# Patient Record
Sex: FEMALE | Race: WHITE | Employment: UNEMPLOYED | URBAN - METROPOLITAN AREA
[De-identification: names, ages, dates, MRNs, and addresses within clinical notes are randomized per-mention and may not be internally consistent; named-entity substitution may affect disease eponyms.]

---

## 2020-11-19 DIAGNOSIS — M79.601 RIGHT ARM PAIN: Primary | ICD-10-CM

## 2020-11-25 ENCOUNTER — HOSPITAL ENCOUNTER (OUTPATIENT)
Dept: GENERAL RADIOLOGY | Age: 25
Discharge: HOME | End: 2020-11-25
Payer: OTHER GOVERNMENT

## 2020-11-25 ENCOUNTER — OFFICE VISIT (OUTPATIENT)
Dept: ORTHOPEDIC SURGERY | Age: 25
End: 2020-11-25
Payer: OTHER GOVERNMENT

## 2020-11-25 VITALS
BODY MASS INDEX: 37.77 KG/M2 | OXYGEN SATURATION: 96 % | WEIGHT: 235 LBS | HEART RATE: 76 BPM | TEMPERATURE: 98.3 F | HEIGHT: 66 IN | DIASTOLIC BLOOD PRESSURE: 82 MMHG | SYSTOLIC BLOOD PRESSURE: 115 MMHG

## 2020-11-25 DIAGNOSIS — M79.2 NEUROGENIC PAIN: ICD-10-CM

## 2020-11-25 DIAGNOSIS — M25.521 CHRONIC PAIN OF RIGHT ELBOW: Primary | ICD-10-CM

## 2020-11-25 DIAGNOSIS — M19.121 POST-TRAUMATIC OSTEOARTHRITIS OF RIGHT ELBOW: ICD-10-CM

## 2020-11-25 DIAGNOSIS — G89.29 CHRONIC PAIN OF RIGHT ELBOW: Primary | ICD-10-CM

## 2020-11-25 DIAGNOSIS — M79.601 RIGHT ARM PAIN: ICD-10-CM

## 2020-11-25 PROCEDURE — 73090 X-RAY EXAM OF FOREARM: CPT

## 2020-11-25 PROCEDURE — 99204 OFFICE O/P NEW MOD 45 MIN: CPT | Performed by: ORTHOPAEDIC SURGERY

## 2020-11-25 RX ORDER — GABAPENTIN 100 MG/1
100 CAPSULE ORAL 3 TIMES DAILY
Qty: 90 CAP | Refills: 0 | Status: SHIPPED | OUTPATIENT
Start: 2020-11-25 | End: 2021-11-30

## 2020-11-25 NOTE — PROGRESS NOTES
Identified pt with two pt identifiers (name and ). Reviewed chart in preparation for visit and have obtained necessary documentation. Dereck Yoder is a 31 y.o. female  Chief Complaint   Patient presents with    Arm Pain     RT arm     Visit Vitals  /82 (BP 1 Location: Left arm, BP Patient Position: Sitting)   Pulse 76   Temp 98.3 °F (36.8 °C) (Tympanic)   Ht 5' 6\" (1.676 m)   Wt 235 lb (106.6 kg)   SpO2 96%   BMI 37.93 kg/m²     1. Have you been to the ER, urgent care clinic since your last visit? Hospitalized since your last visit? No    2. Have you seen or consulted any other health care providers outside of the [address] [place] [address] since your last visit? Include any pap smears or colon screening.  No
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
## 2020-12-09 ENCOUNTER — TELEPHONE (OUTPATIENT)
Dept: ORTHOPEDIC SURGERY | Age: 25
End: 2020-12-09

## 2020-12-09 ENCOUNTER — VIRTUAL VISIT (OUTPATIENT)
Dept: ORTHOPEDIC SURGERY | Age: 25
End: 2020-12-09
Payer: OTHER GOVERNMENT

## 2020-12-09 DIAGNOSIS — G89.29 CHRONIC PAIN OF RIGHT ELBOW: Primary | ICD-10-CM

## 2020-12-09 DIAGNOSIS — T84.84XD PAIN DUE TO INTERNAL ORTHOPEDIC PROSTHETIC DEVICE, SUBSEQUENT ENCOUNTER: ICD-10-CM

## 2020-12-09 DIAGNOSIS — M25.521 CHRONIC PAIN OF RIGHT ELBOW: Primary | ICD-10-CM

## 2020-12-09 PROBLEM — T84.84XA PAIN DUE TO INTERNAL ORTHOPEDIC PROSTHETIC DEVICE (HCC): Status: ACTIVE | Noted: 2020-12-09

## 2020-12-09 PROCEDURE — 99443 PR PHYS/QHP TELEPHONE EVALUATION 21-30 MIN: CPT | Performed by: ORTHOPAEDIC SURGERY

## 2020-12-09 NOTE — TELEPHONE ENCOUNTER
Patient returned call. Requesting surgery before 12/14/20. Advised I would need to check with provider.
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
## 2020-12-10 ENCOUNTER — HOSPITAL ENCOUNTER (OUTPATIENT)
Dept: PREADMISSION TESTING | Age: 25
Discharge: HOME | End: 2020-12-10
Payer: OTHER GOVERNMENT

## 2020-12-10 PROCEDURE — 87635 SARS-COV-2 COVID-19 AMP PRB: CPT

## 2020-12-11 LAB
HEALTH STATUS, XMCV2T: NORMAL
SARS-COV-2, COV2NT: NOT DETECTED
SOURCE, COVRS: NORMAL
SPECIMEN SOURCE, FCOV2M: NORMAL
SPECIMEN TYPE, XMCV1T: NORMAL

## 2020-12-13 ENCOUNTER — ANESTHESIA EVENT (OUTPATIENT)
Dept: SURGERY | Age: 25
End: 2020-12-13
Payer: OTHER GOVERNMENT

## 2020-12-13 NOTE — H&P
12/13/2020    Chief Complaint: right arm pain    HPI: This is a 31 y.o. patient who complains of right arm pain, partially associated with multiple trauma to right elbow, and cannulated screw placement  Patient has been medically and specialty cleared. The patient denies any numbness, weakness, tingling, fevers, chills, nausea, vomiting, fevers, chills, nausea, vomiting. Past Medical History:   Diagnosis Date    Asthma     as child    Psychiatric disorder     anxiety,depression       Past Surgical History:   Procedure Laterality Date    HX ORTHOPAEDIC Right     elbow surgery x 5       No current facility-administered medications on file prior to encounter. Current Outpatient Medications on File Prior to Encounter   Medication Sig Dispense Refill    gabapentin (NEURONTIN) 100 mg capsule Take 1 Cap by mouth three (3) times daily. Max Daily Amount: 300 mg. Indications: neuropathic pain 90 Cap 0       No Known Allergies    Family History   Problem Relation Age of Onset    No Known Problems Mother     Cancer Father        Social History     Socioeconomic History    Marital status:      Spouse name: Not on file    Number of children: Not on file    Years of education: Not on file    Highest education level: Not on file   Tobacco Use    Smoking status: Never Smoker    Smokeless tobacco: Never Used   Substance and Sexual Activity    Alcohol use: Never     Frequency: Never    Drug use: Never    Sexual activity: Never         Review of Systems:   Unless noted in the HPI, all 12 systems have been reviewed and are negative for pertinent positives.       Physical Examination:      AOX3  No apparent distress  Lungs: Clear to auscultation bilaterally  Heart: regular rate and rhythm  Abdomen: soft, no guarding  Head: NC/AT  Sensation intact to light touch: L1-S1 dermatomes    Extremities      Left upper extremity: full active and passive range of motion without pain, deformity, open wound, strength 5/5 in all major muscle groups  Right upper extremity: Well healed surgical scars, limited range of motion, diffuse tenderness to palpation, exquisite at the tip of olecranon process. Right lower extremity:full active and passive range of motion without pain, deformity, open wound, strength 5/5 in all major muscle groups  Left lower extremity: full active and passive range of motion without pain, deformity, open wound, strength 5/5 in all major muscle groups      Pertinent Xrays:  xrays of right elbow indicate retained cannulated screw, malunion of distal humerus, post traumatic arthrosis of right elbow      Assessment: retained hardware right elbow with hardware irritation of right olecranon process. Plan:  Admit to my service  Plan for hardware removal right elbow  Patient is aware that her hardware is only a portion of her pain and improved but persistent pain is expected. We did discuss the risks of surgery which include but are not limited to infection, nerve or blood vessel damage, failure of fixation, failure of any possible implant, need for reoperation, postoperative pain and swelling, intra-or postoperative fracture, postoperative dislocation, leg length inequality, need for reoperation, implant failure, death, disability, organ dysfunction, wound healing issues, DVT, PE, and the need for further procedures. The patient did freely state their understanding and satisfaction with our discussion. We will proceed after medical clearances. The patient was counseled at length about the risks of radha Covid-19 during their perioperative period and any recovery window from their procedure. The patient was made aware that radha Covid-19  may worsen their prognosis for recovering from their procedure and lend to a higher morbidity and/or mortality risk. All material risks, benefits, and reasonable alternatives including postponing the procedure were discussed.  The patient does  wish to proceed with the procedure at this time.

## 2020-12-13 NOTE — ANESTHESIA PREPROCEDURE EVALUATION
Relevant Problems   No relevant active problems       Anesthetic History   No history of anesthetic complications            Review of Systems / Medical History  Patient summary reviewed, nursing notes reviewed and pertinent labs reviewed    Pulmonary            Asthma     Comments: Childhood asthma   Neuro/Psych         Psychiatric history    Comments: Anxiety  Depression Cardiovascular  Within defined limits                Exercise tolerance: >4 METS     GI/Hepatic/Renal  Within defined limits              Endo/Other        Obesity and arthritis     Other Findings   Comments: Neurogenic pain , chronic right elbow         Physical Exam    Airway  Mallampati: II  TM Distance: > 6 cm  Neck ROM: normal range of motion   Mouth opening: Normal     Cardiovascular    Rhythm: regular  Rate: normal         Dental    Dentition: Upper partial plate     Pulmonary  Breath sounds clear to auscultation               Abdominal  GI exam deferred       Other Findings            Anesthetic Plan    ASA: 2  Anesthesia type: total IV anesthesia, MAC and general - backup    Monitoring Plan: BIS      Induction: Intravenous  Anesthetic plan and risks discussed with: Patient

## 2020-12-14 ENCOUNTER — HOSPITAL ENCOUNTER (OUTPATIENT)
Age: 25
Setting detail: OUTPATIENT SURGERY
Discharge: HOME | End: 2020-12-14
Attending: ORTHOPAEDIC SURGERY | Admitting: ORTHOPAEDIC SURGERY
Payer: OTHER GOVERNMENT

## 2020-12-14 ENCOUNTER — APPOINTMENT (OUTPATIENT)
Dept: GENERAL RADIOLOGY | Age: 25
End: 2020-12-14
Attending: ORTHOPAEDIC SURGERY
Payer: OTHER GOVERNMENT

## 2020-12-14 ENCOUNTER — ANESTHESIA (OUTPATIENT)
Dept: SURGERY | Age: 25
End: 2020-12-14
Payer: OTHER GOVERNMENT

## 2020-12-14 VITALS
BODY MASS INDEX: 37.84 KG/M2 | DIASTOLIC BLOOD PRESSURE: 86 MMHG | HEIGHT: 66 IN | HEART RATE: 102 BPM | TEMPERATURE: 99 F | SYSTOLIC BLOOD PRESSURE: 131 MMHG | WEIGHT: 235.45 LBS | OXYGEN SATURATION: 97 % | RESPIRATION RATE: 21 BRPM

## 2020-12-14 DIAGNOSIS — M25.521 CHRONIC PAIN OF RIGHT ELBOW: Primary | ICD-10-CM

## 2020-12-14 DIAGNOSIS — G89.29 CHRONIC PAIN OF RIGHT ELBOW: Primary | ICD-10-CM

## 2020-12-14 LAB — HCG UR QL: NEGATIVE

## 2020-12-14 PROCEDURE — 74011250636 HC RX REV CODE- 250/636: Performed by: ANESTHESIOLOGY

## 2020-12-14 PROCEDURE — A4565 SLINGS: HCPCS | Performed by: ORTHOPAEDIC SURGERY

## 2020-12-14 PROCEDURE — 77030016642 HC BUR ACRN4 MEDT -C: Performed by: ORTHOPAEDIC SURGERY

## 2020-12-14 PROCEDURE — 76210000020 HC REC RM PH II FIRST 0.5 HR: Performed by: ORTHOPAEDIC SURGERY

## 2020-12-14 PROCEDURE — 76010000131 HC OR TIME 2 TO 2.5 HR: Performed by: ORTHOPAEDIC SURGERY

## 2020-12-14 PROCEDURE — 73070 X-RAY EXAM OF ELBOW: CPT

## 2020-12-14 PROCEDURE — 77030018673: Performed by: ORTHOPAEDIC SURGERY

## 2020-12-14 PROCEDURE — 74011250636 HC RX REV CODE- 250/636: Performed by: ORTHOPAEDIC SURGERY

## 2020-12-14 PROCEDURE — 76000 FLUOROSCOPY <1 HR PHYS/QHP: CPT

## 2020-12-14 PROCEDURE — 74011250636 HC RX REV CODE- 250/636: Performed by: NURSE ANESTHETIST, CERTIFIED REGISTERED

## 2020-12-14 PROCEDURE — 76210000006 HC OR PH I REC 0.5 TO 1 HR: Performed by: ORTHOPAEDIC SURGERY

## 2020-12-14 PROCEDURE — 20680 REMOVAL OF IMPLANT DEEP: CPT | Performed by: ORTHOPAEDIC SURGERY

## 2020-12-14 PROCEDURE — 2709999900 HC NON-CHARGEABLE SUPPLY: Performed by: ORTHOPAEDIC SURGERY

## 2020-12-14 PROCEDURE — 77030018846 HC SOL IRR STRL H20 ICUM -A: Performed by: ORTHOPAEDIC SURGERY

## 2020-12-14 PROCEDURE — 74011250637 HC RX REV CODE- 250/637: Performed by: ORTHOPAEDIC SURGERY

## 2020-12-14 PROCEDURE — 76060000035 HC ANESTHESIA 2 TO 2.5 HR: Performed by: ORTHOPAEDIC SURGERY

## 2020-12-14 PROCEDURE — 77030018836 HC SOL IRR NACL ICUM -A: Performed by: ORTHOPAEDIC SURGERY

## 2020-12-14 PROCEDURE — 81025 URINE PREGNANCY TEST: CPT

## 2020-12-14 PROCEDURE — 74011000250 HC RX REV CODE- 250: Performed by: NURSE ANESTHETIST, CERTIFIED REGISTERED

## 2020-12-14 PROCEDURE — 77030031139 HC SUT VCRL2 J&J -A: Performed by: ORTHOPAEDIC SURGERY

## 2020-12-14 RX ORDER — SODIUM CHLORIDE 0.9 % (FLUSH) 0.9 %
5-40 SYRINGE (ML) INJECTION AS NEEDED
Status: DISCONTINUED | OUTPATIENT
Start: 2020-12-14 | End: 2020-12-14 | Stop reason: HOSPADM

## 2020-12-14 RX ORDER — OXYCODONE HYDROCHLORIDE 5 MG/1
5 TABLET ORAL
Status: DISCONTINUED | OUTPATIENT
Start: 2020-12-14 | End: 2020-12-14 | Stop reason: HOSPADM

## 2020-12-14 RX ORDER — ONDANSETRON 2 MG/ML
4 INJECTION INTRAMUSCULAR; INTRAVENOUS AS NEEDED
Status: DISCONTINUED | OUTPATIENT
Start: 2020-12-14 | End: 2020-12-14 | Stop reason: HOSPADM

## 2020-12-14 RX ORDER — CEFAZOLIN SODIUM 1 G/3ML
2 INJECTION, POWDER, FOR SOLUTION INTRAMUSCULAR; INTRAVENOUS ONCE
Status: COMPLETED | OUTPATIENT
Start: 2020-12-14 | End: 2020-12-14

## 2020-12-14 RX ORDER — KETOROLAC TROMETHAMINE 30 MG/ML
INJECTION, SOLUTION INTRAMUSCULAR; INTRAVENOUS AS NEEDED
Status: DISCONTINUED | OUTPATIENT
Start: 2020-12-14 | End: 2020-12-14 | Stop reason: HOSPADM

## 2020-12-14 RX ORDER — HYDROMORPHONE HYDROCHLORIDE 1 MG/ML
INJECTION, SOLUTION INTRAMUSCULAR; INTRAVENOUS; SUBCUTANEOUS AS NEEDED
Status: DISCONTINUED | OUTPATIENT
Start: 2020-12-14 | End: 2020-12-14 | Stop reason: HOSPADM

## 2020-12-14 RX ORDER — LIDOCAINE HYDROCHLORIDE 10 MG/ML
0.1 INJECTION, SOLUTION EPIDURAL; INFILTRATION; INTRACAUDAL; PERINEURAL AS NEEDED
Status: DISCONTINUED | OUTPATIENT
Start: 2020-12-14 | End: 2020-12-14 | Stop reason: HOSPADM

## 2020-12-14 RX ORDER — OXYCODONE HYDROCHLORIDE 5 MG/1
5 TABLET ORAL
Qty: 4 TAB | Refills: 0 | Status: SHIPPED | OUTPATIENT
Start: 2020-12-14 | End: 2020-12-16 | Stop reason: ALTCHOICE

## 2020-12-14 RX ORDER — CELECOXIB 200 MG/1
200 CAPSULE ORAL ONCE
Status: COMPLETED | OUTPATIENT
Start: 2020-12-14 | End: 2020-12-14

## 2020-12-14 RX ORDER — FENTANYL CITRATE 50 UG/ML
INJECTION, SOLUTION INTRAMUSCULAR; INTRAVENOUS AS NEEDED
Status: DISCONTINUED | OUTPATIENT
Start: 2020-12-14 | End: 2020-12-14 | Stop reason: HOSPADM

## 2020-12-14 RX ORDER — SODIUM CHLORIDE 0.9 % (FLUSH) 0.9 %
5-40 SYRINGE (ML) INJECTION EVERY 8 HOURS
Status: DISCONTINUED | OUTPATIENT
Start: 2020-12-14 | End: 2020-12-14 | Stop reason: HOSPADM

## 2020-12-14 RX ORDER — MORPHINE SULFATE 10 MG/ML
2 INJECTION, SOLUTION INTRAMUSCULAR; INTRAVENOUS
Status: DISCONTINUED | OUTPATIENT
Start: 2020-12-14 | End: 2020-12-14 | Stop reason: HOSPADM

## 2020-12-14 RX ORDER — KETAMINE HYDROCHLORIDE 10 MG/ML
INJECTION, SOLUTION INTRAMUSCULAR; INTRAVENOUS AS NEEDED
Status: DISCONTINUED | OUTPATIENT
Start: 2020-12-14 | End: 2020-12-14 | Stop reason: HOSPADM

## 2020-12-14 RX ORDER — SODIUM CHLORIDE, SODIUM LACTATE, POTASSIUM CHLORIDE, CALCIUM CHLORIDE 600; 310; 30; 20 MG/100ML; MG/100ML; MG/100ML; MG/100ML
25 INJECTION, SOLUTION INTRAVENOUS CONTINUOUS
Status: DISCONTINUED | OUTPATIENT
Start: 2020-12-14 | End: 2020-12-14 | Stop reason: HOSPADM

## 2020-12-14 RX ORDER — FENTANYL CITRATE 50 UG/ML
25 INJECTION, SOLUTION INTRAMUSCULAR; INTRAVENOUS
Status: DISCONTINUED | OUTPATIENT
Start: 2020-12-14 | End: 2020-12-14 | Stop reason: HOSPADM

## 2020-12-14 RX ORDER — MIDAZOLAM HYDROCHLORIDE 1 MG/ML
INJECTION, SOLUTION INTRAMUSCULAR; INTRAVENOUS AS NEEDED
Status: DISCONTINUED | OUTPATIENT
Start: 2020-12-14 | End: 2020-12-14 | Stop reason: HOSPADM

## 2020-12-14 RX ORDER — ROPIVACAINE HYDROCHLORIDE 5 MG/ML
INJECTION, SOLUTION EPIDURAL; INFILTRATION; PERINEURAL AS NEEDED
Status: DISCONTINUED | OUTPATIENT
Start: 2020-12-14 | End: 2020-12-14 | Stop reason: HOSPADM

## 2020-12-14 RX ORDER — ACETAMINOPHEN 500 MG
1000 TABLET ORAL ONCE
Status: COMPLETED | OUTPATIENT
Start: 2020-12-14 | End: 2020-12-14

## 2020-12-14 RX ORDER — ONDANSETRON 2 MG/ML
INJECTION INTRAMUSCULAR; INTRAVENOUS AS NEEDED
Status: DISCONTINUED | OUTPATIENT
Start: 2020-12-14 | End: 2020-12-14 | Stop reason: HOSPADM

## 2020-12-14 RX ORDER — PROPOFOL 10 MG/ML
INJECTION, EMULSION INTRAVENOUS
Status: DISCONTINUED | OUTPATIENT
Start: 2020-12-14 | End: 2020-12-14 | Stop reason: HOSPADM

## 2020-12-14 RX ORDER — DIPHENHYDRAMINE HYDROCHLORIDE 50 MG/ML
12.5 INJECTION, SOLUTION INTRAMUSCULAR; INTRAVENOUS
Status: DISCONTINUED | OUTPATIENT
Start: 2020-12-14 | End: 2020-12-14 | Stop reason: HOSPADM

## 2020-12-14 RX ADMIN — Medication 3 AMPULE: at 13:04

## 2020-12-14 RX ADMIN — HYDROMORPHONE HYDROCHLORIDE 0.2 MG: 1 INJECTION, SOLUTION INTRAMUSCULAR; INTRAVENOUS; SUBCUTANEOUS at 14:56

## 2020-12-14 RX ADMIN — KETOROLAC TROMETHAMINE 30 MG: 30 INJECTION, SOLUTION INTRAMUSCULAR; INTRAVENOUS at 16:25

## 2020-12-14 RX ADMIN — OXYCODONE HYDROCHLORIDE 5 MG: 5 TABLET ORAL at 17:46

## 2020-12-14 RX ADMIN — KETAMINE HYDROCHLORIDE 10 MG: 10 INJECTION, SOLUTION INTRAMUSCULAR; INTRAVENOUS at 15:37

## 2020-12-14 RX ADMIN — FENTANYL CITRATE 50 MCG: 50 INJECTION, SOLUTION INTRAMUSCULAR; INTRAVENOUS at 14:21

## 2020-12-14 RX ADMIN — KETAMINE HYDROCHLORIDE 10 MG: 10 INJECTION, SOLUTION INTRAMUSCULAR; INTRAVENOUS at 14:27

## 2020-12-14 RX ADMIN — FENTANYL CITRATE 25 MCG: 50 INJECTION, SOLUTION INTRAMUSCULAR; INTRAVENOUS at 15:04

## 2020-12-14 RX ADMIN — HYDROMORPHONE HYDROCHLORIDE 0.2 MG: 1 INJECTION, SOLUTION INTRAMUSCULAR; INTRAVENOUS; SUBCUTANEOUS at 15:42

## 2020-12-14 RX ADMIN — MIDAZOLAM HYDROCHLORIDE 2 MG: 1 INJECTION, SOLUTION INTRAMUSCULAR; INTRAVENOUS at 14:18

## 2020-12-14 RX ADMIN — ACETAMINOPHEN 1000 MG: 500 TABLET ORAL at 13:02

## 2020-12-14 RX ADMIN — CELECOXIB 200 MG: 200 CAPSULE ORAL at 13:02

## 2020-12-14 RX ADMIN — SODIUM CHLORIDE, POTASSIUM CHLORIDE, SODIUM LACTATE AND CALCIUM CHLORIDE 25 ML/HR: 600; 310; 30; 20 INJECTION, SOLUTION INTRAVENOUS at 13:03

## 2020-12-14 RX ADMIN — HYDROMORPHONE HYDROCHLORIDE 0.2 MG: 1 INJECTION, SOLUTION INTRAMUSCULAR; INTRAVENOUS; SUBCUTANEOUS at 14:18

## 2020-12-14 RX ADMIN — PROPOFOL 75 MCG/KG/MIN: 10 INJECTION, EMULSION INTRAVENOUS at 14:21

## 2020-12-14 RX ADMIN — HYDROMORPHONE HYDROCHLORIDE 0.2 MG: 1 INJECTION, SOLUTION INTRAMUSCULAR; INTRAVENOUS; SUBCUTANEOUS at 14:30

## 2020-12-14 RX ADMIN — KETAMINE HYDROCHLORIDE 10 MG: 10 INJECTION, SOLUTION INTRAMUSCULAR; INTRAVENOUS at 14:55

## 2020-12-14 RX ADMIN — CEFAZOLIN 2 G: 1 INJECTION, POWDER, FOR SOLUTION INTRAMUSCULAR; INTRAVENOUS; PARENTERAL at 14:23

## 2020-12-14 RX ADMIN — HYDROMORPHONE HYDROCHLORIDE 0.2 MG: 1 INJECTION, SOLUTION INTRAMUSCULAR; INTRAVENOUS; SUBCUTANEOUS at 14:36

## 2020-12-14 RX ADMIN — FENTANYL CITRATE 25 MCG: 50 INJECTION, SOLUTION INTRAMUSCULAR; INTRAVENOUS at 14:43

## 2020-12-14 RX ADMIN — HYDROMORPHONE HYDROCHLORIDE 0.4 MG: 1 INJECTION, SOLUTION INTRAMUSCULAR; INTRAVENOUS; SUBCUTANEOUS at 15:51

## 2020-12-14 RX ADMIN — ONDANSETRON HYDROCHLORIDE 4 MG: 2 INJECTION, SOLUTION INTRAMUSCULAR; INTRAVENOUS at 14:29

## 2020-12-14 NOTE — PERIOP NOTES
Handoff Report from Operating Room to PACU    Report received from TRISTEN Márquez and DELLA Do CRNA regarding [employer]. Surgeon(s):  Samy Clement DO  And Procedure(s) (LRB):  REMOVAL OF HARDWARE RIGHT ELBOW (URGENT) (Right)  confirmed   with allergies and dressings discussed. Anesthesia type, drugs, patient history, complications, estimated blood loss, vital signs, intake and output, and last pain medication, lines and temperature were reviewed.

## 2020-12-14 NOTE — DISCHARGE SUMMARY
Date of Admission: 12/14/2020  Date of Discharge: 12/14/2020  Attending on admission and discharge: Dr. Kelton Miller    Admitting Diagnosis:  Pain due to orthopedic device, right elbow  Discharge Diagnosis: same  Procedure Performed: Right elbow hardware removal    Hospital Course and Treatment:     The patient was admitted through the operating room. The patient tolerated the procedure well and was taken to the recovery room for further observation and treatment. The patient was maintained on IV fluids until tolerating a PO diet. They were also maintained on sequential compression devices and DVT prophylaxis. The diet was advanced as tolerated. The patient was mobilized. There were no complications during the course of the hospital stay, and the patient was deemed medically stable for discharge to home. Discharge instructions: The patient should not be in a pool or tub, or otherwise immerse the wound in water. The patient has been counseled on the importance of mobilizing and moving at least every two hours to help prevent blood clots. The patient should call Dr. Guy Schaumann office or go to an emergency department if they note a fever over 101.1 degrees fahrenheit, more or unrelieved pain, more or new swelling at the operative site, or any drainage from the wound. Condition at Discharge: Stable    Discharge medications:  Resume regular home medications  Additional medications to be prescribed on discharge  Oxycodone - sent to pharmacy      Follow up instructions: The patient should follow up in 2 weeks for a wound check and xrays with Dr. Cricket López.

## 2020-12-14 NOTE — BRIEF OP NOTE
Brief Postoperative Note    Patient: Saji Correa  YOB: 1995  MRN: [id number]    Date of Procedure: 12/14/2020     Pre-Op Diagnosis: PAIN DUE TO ORTHOPEDIC DEVICE RIGHT ELBOW    Post-Op Diagnosis: Same as preoperative diagnosis.       Procedure(s):  REMOVAL OF HARDWARE RIGHT ELBOW (URGENT)    Surgeon(s):  Luis Angel Sharma DO    Surgical Assistant: Surg Asst-1: Nicol Mehta    Anesthesia: MAC     Estimated Blood Loss (mL): less than 50     Complications: None    Specimens: * No specimens in log *     Implants: * No implants in log *    Drains: * No LDAs found *    Findings: overgrown screw    Electronically Signed by Viola Aguilar DO on 12/14/2020 at 4:59 PM

## 2020-12-14 NOTE — DISCHARGE INSTRUCTIONS
Right elbow hardware removal      If you have specific questions: CALL OUR OFFICE: [phone]    DRESSING:  Keep dressing on, clean, and dry for three days. Three days after your surgery, you may remove the dressing and wash arm with soap and water. You are not allowed to place this underwater, and once it is cleaned, you should put an occlusive dressing over the wound. ACTIVITY:  Keep the operative arm elevated as much as possible for the next two weeks    It is ok to begin to wiggle the fingers, move the hand and wrist to your tolerance, but do not do any lifting more than food or basic hygiene. GENERAL PAIN CONTROL:    IF YOU HAVE HAD A NERVE BLOCK: remember, you will have an increase in pain sometime in the first 24 hours after your surgery. This can be significant, make sure you are consistently medicating and expect that increase in pain. This is normal, but will require you to be proactive in your pain control. If this increase in pain persists - call Dr. Robert Chan office. Take pain medications as needed and prescribed. Never exceed the maximum dosage. It is reasonable to take something for pain prior to night time on the first night to avoid severe pain in the night. Ice is an important part of your recovery, and best if you do not apply ice or ice pack directly to skin, but use a towel or cloth on your skin. Do this for twenty minutes on the skin, then at least twenty minutes off of your skin. General Postoperative Instructions: If you have specific questions: CALL OUR OFFICE: [phone]    Diet: It is OK to resume your regular diet postoperatively. Start with light liquids and then slowly increase what you eat to avoid postoperative nausea and vomiting. If you do experience nausea, restrict fatty or greasy foods until this passes. If you have continued issues, please call our office number for help: [phone].     Dressings: In general, dressings should remain dry and clean. In many cases, these should stay on until the postoperative visit, unless you've been directed to do so otherwise. Additionally, if the dressing becomes soiled or bloody, please call our office for further instructions. Ice instructions:  Ice is important for pain relief, but can be dangerous if improperly applied. Always make sure that NO ice has direct contact with the skin, and make sure that it never stays on the affected area more than 20 minutes. It is important to have ice off of the area for more than 20 minutes after a session to allow the skin to normalize temperature. It is ok to ice through casts, splints, and dressings, and can still be helpful. Weight bearing: Please refer to the specific surgical weight bearing instructions. Please do not exceed the weight limits as prescribed, as this could cause a surgical complication, wound healing problem, or other issue that could otherwise be avoided. Warning signs: The patient should call Dr. Cassy Houston office or go to an emergency department if they note a fever over 101.1 degrees fahrenheit, more or unrelieved pain, more or new swelling at the operative site, discoloration of the extremity any drainage from the wound. TO PREVENT AN INFECTION      1. [address] YOUR HANDS     To prevent infection, good handwashing is the most important thing you or your caregiver can do.  Wash your hands with soap and water or use the hand  we gave you before you touch any wounds. 2. SHOWER     Use the antibacterial soap we gave you when you take a shower.  Shower with this soap until your wounds are healed.  To reach all areas of your body, you may need someone to help you.  Dont forget to clean your belly button with every shower. 3.  USE CLEAN SHEETS     Use freshly cleaned sheets on your bed after surgery.       To keep the surgery site clean, do not allow pets to sleep with you while your wound is still healing. 4. STOP SMOKING     Stop smoking, or at least cut back on smoking     Smoking slows your healing. 5.  CONTROL YOUR BLOOD SUGAR     High blood sugars slow wound healing. If you are diabetic, control your blood sugar levels before and after your surgery. DISCHARGE SUMMARY from Nurse    The following personal items are in your possession at time of discharge:    Dental Appliances: None  Visual Aid: None  Home Medications: None  Jewelry: None  Clothing: None (left in in pt. room)  Other Valuables: None             PATIENT INSTRUCTIONS:    After general anesthesia or intravenous sedation, for 24 hours or while taking prescription Narcotics:  Limit your activities  Do not drive and operate hazardous machinery  Do not make important personal or business decisions  Do  not drink alcoholic beverages  If you have not urinated within 8 hours after discharge, please contact your surgeon on call. Report the following to your surgeon:  Excessive pain, swelling, redness or odor of or around the surgical area  Temperature over 100.5  Nausea and vomiting lasting longer than 4 hours or if unable to take medications  Any signs of decreased circulation or nerve impairment to extremity: change in color, persistent  numbness, tingling, coldness or increase pain  Any questions    To prevent infection remember to refer to your handout on handwashing given to you by your nurse. *  Please give a list of your current medications to your Primary Care Provider. *  Please update this list whenever your medications are discontinued, doses are      changed, or new medications (including over-the-counter products) are added. *  Please carry medication information at all times in case of emergency situations.           These are general instructions for a healthy lifestyle:    No smoking/ No tobacco products/ Avoid exposure to second hand smoke    Surgeon General's Warning:  Quitting smoking now greatly reduces serious risk to your health. Obesity, smoking, and sedentary lifestyle greatly increases your risk for illness    A healthy diet, regular physical exercise & weight monitoring are important for maintaining a healthy lifestyle    You may be retaining fluid if you have a history of heart failure or if you experience any of the following symptoms:  Weight gain of 3 pounds or more overnight or 5 pounds in a week, increased swelling in our hands or feet or shortness of breath while lying flat in bed. Please call your doctor as soon as you notice any of these symptoms; do not wait until your next office visit. Recognize signs and symptoms of STROKE:    F-face looks uneven    A-arms unable to move or move unevenly    S-speech slurred or non-existent    T-time-call 911 as soon as signs and symptoms begin-DO NOT go       Back to bed or wait to see if you get better-TIME IS BRAIN. The discharge information has been reviewed with the patient. The patient verbalized understanding. Patient Education      Oxycodone, Rapid Release (ETH-Oxydose, Oxy IR, Roxicodone) - (By mouth)   Why this medicine is used:   Treats moderate to severe pain. This medicine is a narcotic pain reliever. Contact a nurse or doctor right away if you have: Fast or slow heart beat, shallow breathing, blue lips, skin or fingernails  Anxiety, restlessness, fever, sweating, muscle spasms, twitching, seeing or hearing things that are not there  Extreme weakness, shallow breathing, slow heartbeat  Severe confusion, lightheadedness, dizziness, fainting  Sweating or cold, clammy skin, seizures  Severe constipation, stomach pain, nausea, vomiting     Common side effects:  Mild constipation  Sleepiness, tiredness  © 2017 [address] [employer] Information is for End User's use only and may not be sold, redistributed or otherwise used for commercial purposes.

## 2020-12-14 NOTE — ANESTHESIA POSTPROCEDURE EVALUATION
Procedure(s):  REMOVAL OF HARDWARE RIGHT ELBOW (URGENT). total IV anesthesia, MAC, general - backup    Anesthesia Post Evaluation        Patient location during evaluation: PACU  Note status: Adequate. Level of consciousness: responsive to verbal stimuli and sleepy but conscious  Pain management: satisfactory to patient  Airway patency: patent  Anesthetic complications: no  Cardiovascular status: acceptable  Respiratory status: acceptable  Hydration status: acceptable  Comments: +Post-Anesthesia Evaluation and Assessment    Patient: Fernando Nicolas MRN: [id number]  SSN: [id number]   YOB: 1995  Age: 31 y.o. Sex: female      Cardiovascular Function/Vital Signs    /80 (BP 1 Location: Left arm, BP Patient Position: At rest)   Pulse 78   Temp 37.1 °C (98.7 °F)   Resp 23   Ht 5' 6\" (1.676 m)   Wt 106.8 kg (235 lb 7.2 oz)   SpO2 98%   BMI 38.00 kg/m²     Patient is status post Procedure(s):  REMOVAL OF HARDWARE RIGHT ELBOW (URGENT). Nausea/Vomiting: Controlled. Postoperative hydration reviewed and adequate. Pain:  Pain Scale 1: Numeric (0 - 10) (12/14/20 1640)  Pain Intensity 1: 0 (12/14/20 1640)   Managed. Neurological Status:   Neuro (WDL): Exceptions to WDL (12/14/20 1640)   At baseline. Mental Status and Level of Consciousness: Arousable. Pulmonary Status:   O2 Device: Room air (12/14/20 1645)   Adequate oxygenation and airway patent. Complications related to anesthesia: None    Post-anesthesia assessment completed. No concerns. Signed By: John Hough MD    12/14/2020  Post anesthesia nausea and vomiting:  controlled      INITIAL Post-op Vital signs:   Vitals Value Taken Time   /87 12/14/2020  5:00 PM   Temp 37.1 °C (98.7 °F) 12/14/2020  4:39 PM   Pulse 86 12/14/2020  5:13 PM   Resp 17 12/14/2020  5:13 PM   SpO2 99 % 12/14/2020  5:13 PM   Vitals shown include unvalidated device data.

## 2020-12-15 NOTE — OP NOTES
Date of Procedure: 12/14/2020  Preoperative Diagnosis: Pain due to orthopedic device, right elbow  Postoperative Diagnosis: Same  Procedure Performed: Deep hardware removal, screw, right olecranon, increased services due to complexity  Surgeon: Sarina Cabrera DO  Assistant: None  Anesthesia: Monitored anesthesia care  Estimated Blood Loss: 20 cc  Specimens: None  Drains: None  Complications: None  Implants: None      Operative Indications: This is a 31 y.o. female who had pain due to orthopedic device, she had an olecranon screw which was painful for her. She had chronic pain, I advised her that she has multiple reasons for pain and hypersensitivity, though the tip of her olecranon was painful and hardware removal was indicated to begin the process of minimizing her pain going forward. We did discuss the risks of surgery which include but are not limited to infection, nerve or blood vessel damage, failure of fixation, failure of any possible implant, need for reoperation, postoperative pain and swelling, intra-or postoperative fracture, postoperative mechanical failure, need for reoperation, implant failure, death, disability, organ dysfunction, wound healing issues, DVT, PE, and the need for further procedures. The patient did freely state their understanding and satisfaction with our discussion. Appropriate medical clearances have been obtained. Description of Procedure:  After the correct site and side were identified by myself in the holding area, the patient was transported to the surgical suite, where, after induction of monitored anesthesia, the patient was positioned in the right lateral position. The right arm was then prepped and draped in the usual sterile orthopedic fashion. I did infuse the soft tissues with 1/2% ropivacaine.   After it was clear that adequate anesthesia was completed, I made a limited incision over the previous scar which was curvilinear at the tip of the olecranon, sharp dissection was carried down to fascia. I used electrocautery for hemostasis. I then identified the protuberant hardware, this was well on ground with callus and new bone, I utilized a rongeur to clear off the head of the headless compression screw. Once this was clear of all debris, I used the specialty screwdriver, there was significant bone on growth through the threads and this made extraction difficult, unfortunately, and attempts to remove the screw, the screw head did strip due to the increased torque necessary due to ingrowth. I then used the broken screw set as a adjunct to screw removal, the reverse threaded broken screw set also stripped out. I then attempted to use a trephine to break up any more superficial bone on growth to hopefully remove the screw, I did not want to cause more bone disruption than necessary, after several attempts at removal, the head did break off, and looking at the films, it was clear that the shaft of the screw was directly along the ulnar border of the cortical bone and that using a trephine to remove the remaining screw would cause damage and potentially cannulate through the bone with unnecessary morbidity. Due to this, I utilized a metal cutting bur to ensure that all metal was contained within bone, the wound was then copiously irrigated with normal saline. This extraction did take an extra hour beyond normal extraction timeframe. I then closed the insertion of the triceps, which have been minimally elevated in exposure. Skin was closed with 2-0 Vicryl, 3-0 Monocryl. Sterile dressing was applied. Patient was awoken and taken to PACU in stable condition without complication. Postoperative plan: Patient will gently use the arm, no heavy lifting. Plan will be to proceed with pain medications as needed, she is hoping to minimize this since she has been given only limited supply. Follow-up will be 2 weeks. No x-rays.

## 2020-12-16 DIAGNOSIS — M25.521 CHRONIC PAIN OF RIGHT ELBOW: Primary | ICD-10-CM

## 2020-12-16 DIAGNOSIS — M25.521 CHRONIC PAIN OF RIGHT ELBOW: ICD-10-CM

## 2020-12-16 DIAGNOSIS — G89.29 CHRONIC PAIN OF RIGHT ELBOW: ICD-10-CM

## 2020-12-16 DIAGNOSIS — G89.29 CHRONIC PAIN OF RIGHT ELBOW: Primary | ICD-10-CM

## 2020-12-16 RX ORDER — OXYCODONE HYDROCHLORIDE 5 MG/1
5 TABLET ORAL
Qty: 10 TAB | Refills: 0 | Status: SHIPPED | OUTPATIENT
Start: 2020-12-16 | End: 2020-12-19

## 2020-12-16 RX ORDER — OXYCODONE HYDROCHLORIDE 5 MG/1
5 TABLET ORAL
Qty: 4 TAB | Refills: 0 | OUTPATIENT
Start: 2020-12-16 | End: 2020-12-23

## 2021-01-08 ENCOUNTER — OFFICE VISIT (OUTPATIENT)
Dept: ORTHOPEDIC SURGERY | Age: 26
End: 2021-01-08
Payer: OTHER GOVERNMENT

## 2021-01-08 VITALS
HEART RATE: 92 BPM | DIASTOLIC BLOOD PRESSURE: 84 MMHG | BODY MASS INDEX: 38.89 KG/M2 | OXYGEN SATURATION: 98 % | TEMPERATURE: 98.2 F | SYSTOLIC BLOOD PRESSURE: 120 MMHG | WEIGHT: 242 LBS | HEIGHT: 66 IN

## 2021-01-08 DIAGNOSIS — Z47.89 ORTHOPEDIC AFTERCARE: Primary | ICD-10-CM

## 2021-01-08 PROCEDURE — 99024 POSTOP FOLLOW-UP VISIT: CPT | Performed by: ORTHOPAEDIC SURGERY

## 2021-01-08 NOTE — PROGRESS NOTES
is here for a follow up visit from a right elbow hardware removal.    Pain has been appropriate since surgery, no major medical complications since surgery. Current Outpatient Medications on File Prior to Visit   Medication Sig Dispense Refill    gabapentin (NEURONTIN) 100 mg capsule Take 1 Cap by mouth three (3) times daily. Max Daily Amount: 300 mg. Indications: neuropathic pain 90 Cap 0     No current facility-administered medications on file prior to visit. ROS:  General: denies agitation, major chest pain, unexpected weakness  Patient states no issues  Skin: healing wound is without issue or drainage   Strength: appropriate weakness of involved extremity is resolving since surgery      Physical Examination:    Blood pressure 120/84, pulse 92, temperature 98.2 °F (36.8 °C), temperature source Tympanic, height 5' 6\" (1.676 m), weight 242 lb (109.8 kg), last menstrual period 11/13/2020, SpO2 98 %.     Dressing: None  Skin: Clean, dry, intact  Sensation intact to light touch at level of wound and distally  Strength is not tested  Range of motion: Slight limitation with full flexion of the elbow  Distal swelling is noted, but appropriate for postoperative course  Distal capillary refill less than 2 seconds      Imaging:    Postoperative imaging: None today      Assessment: Status post hardware removal right elbow    Plan:  Patient will do home physical therapy  Wound care was reviewed  Weightbearing will be full through the elbow  Deep Venous Thrombosis Prophylaxis: None     Follow up will be at 3 weeks from now, no xrays on follow up

## 2021-01-08 NOTE — PROGRESS NOTES
Identified pt with two pt identifiers (name and ). Reviewed chart in preparation for visit and have obtained necessary documentation. Ludivina Ramos is a 31 y.o. female  Chief Complaint   Patient presents with    Surgical Follow-up     RT elbow     Visit Vitals  /84 (BP 1 Location: Left arm, BP Patient Position: Sitting)   Pulse 92   Temp 98.2 °F (36.8 °C) (Tympanic)   Ht 5' 6\" (1.676 m)   Wt 242 lb (109.8 kg)   LMP 2020 (Approximate)   SpO2 98%   BMI 39.06 kg/m²     1. Have you been to the ER, urgent care clinic since your last visit? Hospitalized since your last visit? No    2. Have you seen or consulted any other health care providers outside of the [address] [place] [address] since your last visit? Include any pap smears or colon screening.  No

## 2021-01-29 ENCOUNTER — OFFICE VISIT (OUTPATIENT)
Dept: ORTHOPEDIC SURGERY | Age: 26
End: 2021-01-29
Payer: OTHER GOVERNMENT

## 2021-01-29 VITALS
DIASTOLIC BLOOD PRESSURE: 76 MMHG | HEIGHT: 66 IN | WEIGHT: 241 LBS | HEART RATE: 79 BPM | TEMPERATURE: 98.2 F | SYSTOLIC BLOOD PRESSURE: 109 MMHG | OXYGEN SATURATION: 98 % | BODY MASS INDEX: 38.73 KG/M2

## 2021-01-29 DIAGNOSIS — Z47.89 ORTHOPEDIC AFTERCARE: Primary | ICD-10-CM

## 2021-01-29 PROCEDURE — 99024 POSTOP FOLLOW-UP VISIT: CPT | Performed by: ORTHOPAEDIC SURGERY

## 2021-01-29 NOTE — PROGRESS NOTES
is here for a follow up visit from a right elbow hardware removal.    Pain has been appropriate since surgery, no major medical complications since surgery. Current Outpatient Medications on File Prior to Visit   Medication Sig Dispense Refill    gabapentin (NEURONTIN) 100 mg capsule Take 1 Cap by mouth three (3) times daily. Max Daily Amount: 300 mg. Indications: neuropathic pain 90 Cap 0     No current facility-administered medications on file prior to visit. ROS:  General: denies agitation, major chest pain, unexpected weakness  Patient states no new issues, improving pain with active elbow extension  Skin: healing wound is without issue or drainage   Strength: appropriate weakness of involved extremity is resolving since surgery      Physical Examination:    Blood pressure 109/76, pulse 79, temperature 98.2 °F (36.8 °C), temperature source Tympanic, height 5' 6\" (1.676 m), weight 241 lb (109.3 kg), last menstrual period 11/13/2020, SpO2 98 %.     Dressing: none  Skin: clean, dry, intact  Sensation intact to light touch at level of wound and distally  Strength is unchanged  Range of motion is unchanged  Distal swelling is noted, but appropriate for postoperative course  Distal capillary refill less than 2 seconds      Imaging:    Postoperative imaging: none      Assessment: Status post right elbow hardware removal    Plan:  Patient will continue home physical therapy  Wound care was reviewed  Weightbearing will be full through the arm  Deep Venous Thrombosis Prophylaxis: none  Return to normal activities one month from now  Follow up will be at 4 weeks from now,  no xrays on follow up

## 2021-01-29 NOTE — PROGRESS NOTES
Identified pt with two pt identifiers (name and ). Reviewed chart in preparation for visit and have obtained necessary documentation. Michael Verduzco is a 31 y.o. female  Chief Complaint   Patient presents with    Surgical Follow-up     RT elbow     Visit Vitals  /76 (BP 1 Location: Right upper arm, BP Patient Position: Sitting)   Pulse 79   Temp 98.2 °F (36.8 °C) (Tympanic)   Ht 5' 6\" (1.676 m)   Wt 241 lb (109.3 kg)   LMP 2020 (Approximate)   SpO2 98%   BMI 38.90 kg/m²     1. Have you been to the ER, urgent care clinic since your last visit? Hospitalized since your last visit? No    2. Have you seen or consulted any other health care providers outside of the [address] [place] [address] since your last visit? Include any pap smears or colon screening.  No

## 2021-03-05 ENCOUNTER — VIRTUAL VISIT (OUTPATIENT)
Dept: ORTHOPEDIC SURGERY | Age: 26
End: 2021-03-05
Payer: OTHER GOVERNMENT

## 2021-03-05 DIAGNOSIS — Z47.89 ORTHOPEDIC AFTERCARE: Primary | ICD-10-CM

## 2021-03-05 PROCEDURE — 99024 POSTOP FOLLOW-UP VISIT: CPT | Performed by: ORTHOPAEDIC SURGERY

## 2021-03-05 NOTE — PROGRESS NOTES
3/5/2021      CC: Right forearm hardware removal    HPI:      This is a 31y.o. year old female who is now several weeks status post hardware removal right olecranon screw, she has been doing well, she is able to do almost all of her normal activities without any difficulty or discomfort. She is very happy with the way things have gone so far. No major complaints overall. No wound healing problems. PMH:  Past Medical History:   Diagnosis Date    Asthma     as child    Psychiatric disorder     anxiety,depression       PSxHx:  Past Surgical History:   Procedure Laterality Date    HX ORTHOPAEDIC Right     elbow surgery x 5       Meds:    Current Outpatient Medications:     gabapentin (NEURONTIN) 100 mg capsule, Take 1 Cap by mouth three (3) times daily. Max Daily Amount: 300 mg.  Indications: neuropathic pain, Disp: 90 Cap, Rfl: 0    All:  No Known Allergies    Social Hx:  Social History     Socioeconomic History    Marital status:      Spouse name: Not on file    Number of children: Not on file    Years of education: Not on file    Highest education level: Not on file   Tobacco Use    Smoking status: Never Smoker    Smokeless tobacco: Never Used   Substance and Sexual Activity    Alcohol use: Never     Frequency: Never    Drug use: Never    Sexual activity: Never       Family Hx:  Family History   Problem Relation Age of Onset    No Known Problems Mother     Cancer Father          Review of Systems:       General: Denies headache, lethargy, fever, weight loss  Ears/Nose/Throat: Denies ear discharge, drainage, nosebleeds, hoarse voice, dental problems  Cardiovascular: Denies chest pain, shortness of breath  Lungs: Denies chest pain, breathing problems, wheezing, pneumonia  Stomach: Denies stomach pain, heartburn, constipation, irritable bowel  Skin: Denies rash, sores, open wounds  Musculoskeletal: Healing right elbow wound  Genitourinary: Denies dysuria, hematuria, polyuria  Gastrointestinal: Denies constipation, obstipation, diarrhea  Neurological: Denies changes in sight, smell, hearing, taste, seizures. Denies loss of consciousness. Psychiatric: Denies depression, sleep pattern changes, anxiety, change in personality  Endocrine: Denies mood swings, heat or cold intolerance  Hematologic/Lymphatic: Denies anemia, purpura, petechia  Allergic/Immunologic: Denies swelling of throat, pain or swelling at lymph nodes      Physical Examination:    There were no vitals taken for this visit. General: AOX3, no apparent distress  Psychiatric: mood and affect appropriate        Diagnostics:    Pertinent Diagnostics: None today    Assessment: Status post right elbow hardware removal    Plan:    Patient is doing quite well, she will be weightbearing to tolerance, she is aware that it will be several more weeks to months before she is completely resolved, she has no wound healing issues and is quite happy with the hardware removal as it is. Overall, quite happy and will follow-up on an as-needed basis should any questions or concerns arise. The patient was in [place] at the time of consultation. I was in the office while conducting this encounter. Consent:  She and/or her healthcare decision maker is aware that this patient-initiated Telehealth encounter is a billable service, with coverage as determined by her insurance carrier. She is aware that she may receive a bill and has provided verbal consent to proceed: Yes    This virtual visit was conducted via [employer]. Pursuant to the emergency declaration under the [facility] waiver authority and the [facility] and [employer] General Act, this Virtual  Visit was conducted to reduce the patient's risk of exposure to COVID-19 and provide continuity of care for an established patient.   Services were provided through a video synchronous discussion virtually to substitute for in-person clinic visit. Due to this being a TeleHealth evaluation, many elements of the physical examination are unable to be assessed. Total Time: minutes: 5-10 minutes. Ms. Lul Ray has a reminder for a \"due or due soon\" health maintenance. I have asked that she contact her primary care provider for follow-up on this health maintenance.

## 2021-05-05 ENCOUNTER — VIRTUAL VISIT (OUTPATIENT)
Dept: FAMILY MEDICINE CLINIC | Age: 26
End: 2021-05-05
Payer: OTHER GOVERNMENT

## 2021-05-05 DIAGNOSIS — G43.109 MIGRAINE WITH AURA AND WITHOUT STATUS MIGRAINOSUS, NOT INTRACTABLE: Primary | ICD-10-CM

## 2021-05-05 PROCEDURE — 99203 OFFICE O/P NEW LOW 30 MIN: CPT | Performed by: FAMILY MEDICINE

## 2021-05-05 RX ORDER — AMITRIPTYLINE HYDROCHLORIDE 10 MG/1
10 TABLET, FILM COATED ORAL
Qty: 30 TAB | Refills: 1 | Status: SHIPPED | OUTPATIENT
Start: 2021-05-05 | End: 2021-06-04 | Stop reason: SINTOL

## 2021-05-05 RX ORDER — NORETHINDRONE ACETATE AND ETHINYL ESTRADIOL, AND FERROUS FUMARATE 1.5-30(21)
1 KIT ORAL DAILY
COMMUNITY

## 2021-05-05 RX ORDER — SUMATRIPTAN 50 MG/1
50 TABLET, FILM COATED ORAL
Qty: 9 TAB | Refills: 0 | Status: SHIPPED | OUTPATIENT
Start: 2021-05-05 | End: 2021-11-30

## 2021-05-05 NOTE — PROGRESS NOTES
Martínez Houston is a 31 y.o.  female and presents with    Chief Complaint   Patient presents with    Migraine     Martínez Houston, who was evaluated through a synchronous (real-time) audio-video encounter, and/or her healthcare decision maker, is aware that it is a billable service, with coverage as determined by her insurance carrier. She provided verbal consent to proceed: Yes, and patient identification was verified. This visit was conducted pursuant to the emergency declaration under the [facility] [address] authority and the [employer] Act. A caregiver was present when appropriate. Ability to conduct physical exam was limited. The patient was located in a state where the provider was credentialed to provide care. --Dougie Huerta MD on 5/5/2021 at 10:49 AM    Subjective:  Headache  Patient complains of headache. She does have a headache at this time. Description of Headaches:  Location of pain: left-sided unilateral  Radiation of pain?:forehead  Character of pain:pulsating  Severity of pain: 7  Accompanying symptoms: nausea, photophobia, eye pain  Prodromal sx?: eye pain  Rapidity of onset: sudden  Typical duration of individual headache: 4 hours  Are most headaches similar in presentation?  yes  Typical precipitants: none known    Temporal Pattern of Headaches:  Started having HAs 5 years ago while she was taking oral contraception  Worst time of day: mid-day  Awaken from sleep?: no, but they can prevent her from getting to sleep  Seasonal pattern?: no  Clustering of HAs over time? no  Overall pattern since problem began: gradually worsening    Degree of Functional Impairment: severe    Current Use of Meds to Treat HA:  Abortive meds? acetaminophen, aspirin/acetaminophen/caffeine; ice pack  Daily use? no  Prophylactic meds? none    Additional Relevant History:  History of head/neck trauma? no  History of head/neck surgery? no  Family h/o headache problems? no  Use of meds that might worsen HAs? Oral contraceptions  Exposure to carbon monoxide? no  Substance use: caffeine: 2 cherry cokes    ROS   General ROS: negative for - chills, fatigue or fever  Psychological ROS: negative for - anxiety or depression  Ophthalmic ROS: negative for - double vision  Endocrine ROS: negative for - polydipsia/polyuria or temperature intolerance  Respiratory ROS: no cough, shortness of breath, or wheezing  Cardiovascular ROS: no chest pain or dyspnea on exertion  Gastrointestinal ROS: no abdominal pain, change in bowel habits, or black or bloody stools  Genito-Urinary ROS: no dysuria, trouble voiding, or hematuria  Musculoskeletal ROS: negative for - joint pain or muscle pain  Neurological ROS: negative for - numbness/tingling or weakness  Dermatological ROS: negative for - rash or skin lesion changes    All other systems reviewed and are negative. Objective: There were no vitals filed for this visit. alert, well appearing, and in no distress, oriented to person, place, and time and obese  Mental status - normal mood, behavior, speech, dress, motor activity, and thought processes  Chest - normal work of breathing  Neurological - cranial nerves II through XII intact    LABS     TESTS      Assessment/Plan:    1. Migraine with aura and without status migrainosus, not intractable  Start abortive therapy and prophylaxis;  - amitriptyline (ELAVIL) 10 mg tablet; Take 1 Tab by mouth nightly. Dispense: 30 Tab; Refill: 1  - SUMAtriptan (IMITREX) 50 mg tablet; Take 1 Tab by mouth daily as needed for Migraine. Dispense: 9 Tab; Refill: 0      Lab review: no lab studies available for review at time of visit      I have discussed the diagnosis with the patient and the intended plan as seen in the above orders. I have discussed medication side effects and warnings with the patient as well.  I have reviewed the plan of care with the patient, accepted their input and they are in agreement with the treatment goals.

## 2021-05-05 NOTE — PROGRESS NOTES
Patient presents in office today as new patient.   Patient concerns: referral for a neurologist for migraines

## 2021-06-04 ENCOUNTER — OFFICE VISIT (OUTPATIENT)
Dept: FAMILY MEDICINE CLINIC | Age: 26
End: 2021-06-04
Payer: OTHER GOVERNMENT

## 2021-06-04 VITALS
HEIGHT: 66 IN | HEART RATE: 86 BPM | RESPIRATION RATE: 16 BRPM | BODY MASS INDEX: 39.37 KG/M2 | TEMPERATURE: 98.3 F | SYSTOLIC BLOOD PRESSURE: 120 MMHG | WEIGHT: 245 LBS | DIASTOLIC BLOOD PRESSURE: 80 MMHG | OXYGEN SATURATION: 98 %

## 2021-06-04 DIAGNOSIS — Z00.00 ANNUAL PHYSICAL EXAM: Primary | ICD-10-CM

## 2021-06-04 DIAGNOSIS — G43.109 MIGRAINE WITH AURA AND WITHOUT STATUS MIGRAINOSUS, NOT INTRACTABLE: ICD-10-CM

## 2021-06-04 DIAGNOSIS — F41.9 ANXIETY: ICD-10-CM

## 2021-06-04 DIAGNOSIS — R11.0 NAUSEA: ICD-10-CM

## 2021-06-04 DIAGNOSIS — E66.9 OBESITY (BMI 30-39.9): ICD-10-CM

## 2021-06-04 PROCEDURE — 99395 PREV VISIT EST AGE 18-39: CPT | Performed by: FAMILY MEDICINE

## 2021-06-04 RX ORDER — PROPRANOLOL HYDROCHLORIDE 20 MG/1
20 TABLET ORAL 3 TIMES DAILY
Qty: 90 TABLET | Refills: 2 | Status: SHIPPED | OUTPATIENT
Start: 2021-06-04

## 2021-06-04 RX ORDER — PROMETHAZINE HYDROCHLORIDE 25 MG/1
25 TABLET ORAL
Qty: 30 TABLET | Refills: 1 | Status: SHIPPED | OUTPATIENT
Start: 2021-06-04 | End: 2021-12-04

## 2021-06-04 NOTE — PROGRESS NOTES
Arik Nance presents today for   Chief Complaint   Patient presents with    Arthritis    Migraine    Nausea       Is someone accompanying this pt? no    Is the patient using any DME equipment during OV? no    Depression Screening:  3 most recent PHQ Screens 6/4/2021   Little interest or pleasure in doing things Several days   Feeling down, depressed, irritable, or hopeless Several days   Total Score PHQ 2 2       Learning Assessment:  Learning Assessment 5/5/2021   PRIMARY LEARNER Patient   PRIMARY LANGUAGE ENGLISH   LEARNER PREFERENCE PRIMARY DEMONSTRATION   ANSWERED BY patient   RELATIONSHIP SELF       Abuse Screening:  Abuse Screening Questionnaire 5/5/2021   Do you ever feel afraid of your partner? N   Are you in a relationship with someone who physically or mentally threatens you? N   Is it safe for you to go home? Y       Fall Risk  No flowsheet data found. Health Maintenance reviewed and discussed and ordered per Provider. Health Maintenance Due   Topic Date Due    Hepatitis C Screening  Never done    HPV Age 9Y-34Y (1 - 2-dose series) Never done    COVID-19 Vaccine (1) Never done    DTaP/Tdap/Td series (1 - Tdap) Never done    PAP AKA CERVICAL CYTOLOGY  Never done   . Coordination of Care:  1. Have you been to the ER, urgent care clinic since your last visit? Hospitalized since your last visit? no    2. Have you seen or consulted any other health care providers outside of the [address] [place] [address] since your last visit? Include any pap smears or colon screening. no      Last  Checked na  Last UDS Checked na  Last Pain contract signed: na    Patient presents in office today for routine care.   Patient concerns: med eval

## 2021-06-04 NOTE — PROGRESS NOTES
Janae Elias is a 31 y.o.  female and presents with    Chief Complaint   Patient presents with    Arthritis    Migraine    Nausea    Complete Physical       Subjective: Well Adult Physical   Patient here for a comprehensive physical exam.The patient reports problems - arthralgias, migraine, nausea  Do you take any herbs or supplements that were not prescribed by a doctor? no Are you taking calcium supplements? no Are you taking aspirin daily? not applicable    She has migraine and was started on TCA at her last encounter; she had improved sleep; she had decrease in frequency; she had increase in severity. imitrex helped with abortive therapy. ROS   General ROS: negative for - chills, fatigue or fever  Psychological ROS: negative for - anxiety or depression  Ophthalmic ROS: negative for - double vision  Endocrine ROS: negative for - polydipsia/polyuria or temperature intolerance  Respiratory ROS: no cough, shortness of breath, or wheezing  Cardiovascular ROS: no chest pain or dyspnea on exertion  Gastrointestinal ROS: no abdominal pain, change in bowel habits, or black or bloody stools  Genito-Urinary ROS: no dysuria, trouble voiding, or hematuria  Musculoskeletal ROS: negative for - joint pain or muscle pain  Neurological ROS: negative for - numbness/tingling or weakness  Dermatological ROS: negative for - rash or skin lesion changes     All other systems reviewed and are negative. All other systems reviewed and are negative. Objective:  Vitals:    06/04/21 1024   BP: 120/80   Pulse: 86   Resp: 16   Temp: 98.3 °F (36.8 °C)   TempSrc: Temporal   SpO2: 98%   Weight: 245 lb (111.1 kg)   Height: 5' 6\" (1.676 m)   PainSc:   0 - No pain   LMP: 05/12/2020     BMI 39.54 kg/m²       General appearance  alert, cooperative, no distress, appears stated age   Head  Normocephalic, without obvious abnormality, atraumatic   Eyes  conjunctivae/corneas clear. PERRL, EOM's intact.  Fundi benign   Ears  normal TM's and external ear canals AU   Nose Nares normal. Septum midline. Mucosa normal. No drainage or sinus tenderness. Throat Lips, mucosa, and tongue normal. Teeth and gums normal   Neck supple, symmetrical, trachea midline, no adenopathy, thyroid: not enlarged, symmetric, no tenderness/mass/nodules   Back   symmetric, no curvature. ROM normal. No CVA tenderness   Lungs   clear to auscultation bilaterally   Breasts  Not examined   Heart  regular rate and rhythm, S1, S2 normal, no murmur, click, rub or gallop   Abdomen   soft, non-tender. Bowel sounds normal. No masses,  No organomegaly   Pelvic Deferred   Extremities extremities normal, atraumatic, no cyanosis or edema   Pulses 2+ and symmetric   Skin Skin color, texture, turgor normal. No rashes or lesions   Lymph nodes Cervical, supraclavicular, and axillary nodes normal.   Neurologic Normal     LABS     TESTS      Assessment/Plan:    1. Annual physical exam  Reviewed preventive recommendations    2. Anxiety  Start beta blocker for relief of symptoms  - propranoloL (INDERAL) 20 mg tablet; Take 1 Tablet by mouth three (3) times daily. Dispense: 90 Tablet; Refill: 2    3. Migraine with aura and without status migrainosus, not intractable  Start prophylaxis  - fremanezumab-vfrm (AJOVY) 225 mg/1.5 mL auto-injector; 1.5 mL by SubCUTAneous route every month. Dispense: 1 Syringe; Refill: 2    4. Nausea  Antiemetic ordered  - promethazine (PHENERGAN) 25 mg tablet; Take 1 Tablet by mouth every six (6) hours as needed for Nausea. Dispense: 30 Tablet; Refill: 1  Urine hcg    Lab review: orders written for new lab studies as appropriate; see orders      I have discussed the diagnosis with the patient and the intended plan as seen in the above orders. The patient has received an after-visit summary and questions were answered concerning future plans. I have discussed medication side effects and warnings with the patient as well.  I have reviewed the plan of care with the patient, accepted their input and they are in agreement with the treatment goals.

## 2021-08-11 ENCOUNTER — VIRTUAL VISIT (OUTPATIENT)
Dept: FAMILY MEDICINE CLINIC | Age: 26
End: 2021-08-11
Payer: OTHER GOVERNMENT

## 2021-08-11 DIAGNOSIS — G43.109 MIGRAINE WITH AURA AND WITHOUT STATUS MIGRAINOSUS, NOT INTRACTABLE: ICD-10-CM

## 2021-08-11 DIAGNOSIS — U07.1 COVID-19: Primary | ICD-10-CM

## 2021-08-11 PROCEDURE — 99213 OFFICE O/P EST LOW 20 MIN: CPT | Performed by: FAMILY MEDICINE

## 2021-08-11 NOTE — PROGRESS NOTES
Cristian Minaya is a 31 y.o.  female and presents with    Chief Complaint   Patient presents with   Dwight Simmons, who was evaluated through a synchronous (real-time) audio-video encounter, and/or her healthcare decision maker, is aware that it is a billable service, with coverage as determined by her insurance carrier. She provided verbal consent to proceed: Yes, and patient identification was verified. This visit was conducted pursuant to the emergency declaration under the [address] [place] [address] and the [employer] Act. A caregiver was present when appropriate. Ability to conduct physical exam was limited. The patient was located in a state where the provider was credentialed to provide care. --Tino Dent MD on 8/11/2021 at 1:44 PM    Cristian Minaya, who was evaluated through a synchronous (real-time) audio-video encounter, and/or her healthcare decision maker, is aware that it is a billable service, with coverage as determined by her insurance carrier. She provided verbal consent to proceed: Yes, and patient identification was verified. This visit was conducted pursuant to the emergency declaration under the [address] [place] [address] and the [employer] Act. A caregiver was present when appropriate. Ability to conduct physical exam was limited. The patient was located in a state where the provider was credentialed to provide care. --Tino Dent MD on 8/11/2021 at 1:44 PM    Subjective:  She is positive for covid and had diagnosis 1 week ago in the ER. She has had pain in her jaw. One week ago she had shortness of breath and weakness. She did not fall. She was prescribed an albuterol inhaler and tessalon perles; she underwent chest xray.   She was in close contact with someone who was exposed to Jadyn. She is in quarantine for a total of 14 days. She is currently experiencing right of the face top and bottom jaw. She has pain with chewing and while she is talking. She describes the pain as sharp. She was last seen by the dentist a year ago. She has migraine which has been controlled.     ROS   General ROS: negative for - chills, fatigue or fever  Psychological ROS: negative for - anxiety or depression  Ophthalmic ROS: negative for - double vision  Endocrine ROS: negative for - polydipsia/polyuria or temperature intolerance  Respiratory ROS: no cough, shortness of breath, or wheezing  Cardiovascular ROS: no chest pain or dyspnea on exertion  Gastrointestinal ROS: no abdominal pain, change in bowel habits, or black or bloody stools  Genito-Urinary ROS: no dysuria, trouble voiding, or hematuria  Musculoskeletal ROS: negative for - joint pain or muscle pain  Neurological ROS: negative for - numbness/tingling or weakness  Dermatological ROS: negative for - rash or skin lesion changes     All other systems reviewed and are negative. .      Objective: There were no vitals filed for this visit. alert, well appearing, and in no distress, oriented to person, place, and time and overweight  Mental status - normal mood, behavior, speech, dress, motor activity, and thought processes  Chest - normal work of breathing  Neurological - cranial nerves II through XII intact    LABS     TESTS      Assessment/Plan:    1. COVID-19  Supportive treatment; continue quarantine for the prescribed time    2. Migraine with aura and without status migrainosus, not intractable  Continue current treatment      Lab review: labs reviewed, I note that covid +      I have discussed the diagnosis with the patient and the intended plan as seen in the above orders. I have discussed medication side effects and warnings with the patient as well.  I have reviewed the plan of care with the patient, accepted their input and they are in agreement with the treatment goals.

## 2021-08-11 NOTE — PROGRESS NOTES
Dania Baker presents today for   Chief Complaint   Patient presents with    Positive For Covid-19       Is someone accompanying this pt? na    Is the patient using any DME equipment during OV? na    Depression Screening:  3 most recent PHQ Screens 6/4/2021   Little interest or pleasure in doing things Several days   Feeling down, depressed, irritable, or hopeless Several days   Total Score PHQ 2 2       Learning Assessment:  Learning Assessment 5/5/2021   PRIMARY LEARNER Patient   PRIMARY LANGUAGE ENGLISH   LEARNER PREFERENCE PRIMARY DEMONSTRATION   ANSWERED BY patient   RELATIONSHIP SELF       Abuse Screening:  Abuse Screening Questionnaire 5/5/2021   Do you ever feel afraid of your partner? N   Are you in a relationship with someone who physically or mentally threatens you? N   Is it safe for you to go home? Y       Fall Risk  No flowsheet data found. Health Maintenance reviewed and discussed and ordered per Provider. Health Maintenance Due   Topic Date Due    Hepatitis C Screening  Never done    HPV Age 9Y-34Y (1 - 2-dose series) Never done    COVID-19 Vaccine (1) Never done    DTaP/Tdap/Td series (1 - Tdap) Never done   . Coordination of Care:  1. Have you been to the ER, urgent care clinic since your last visit? Hospitalized since your last visit? Yes, 8/5/21, [facility] ER, positive for covid    2. Have you seen or consulted any other health care providers outside of the [address] [place] [address] since your last visit? Include any pap smears or colon screening.  no      Last  Checked na  Last UDS Checked na  Last Pain contract signed: na    Patients concerns today:  Positive for covid

## 2021-11-15 ENCOUNTER — OFFICE VISIT (OUTPATIENT)
Dept: FAMILY MEDICINE CLINIC | Age: 26
End: 2021-11-15
Payer: OTHER GOVERNMENT

## 2021-11-15 VITALS
HEIGHT: 66 IN | WEIGHT: 250.6 LBS | HEART RATE: 92 BPM | RESPIRATION RATE: 16 BRPM | OXYGEN SATURATION: 98 % | TEMPERATURE: 98.7 F | BODY MASS INDEX: 40.27 KG/M2 | DIASTOLIC BLOOD PRESSURE: 87 MMHG | SYSTOLIC BLOOD PRESSURE: 137 MMHG

## 2021-11-15 DIAGNOSIS — R61 SWEATING INCREASE: ICD-10-CM

## 2021-11-15 DIAGNOSIS — Z23 ENCOUNTER FOR IMMUNIZATION: ICD-10-CM

## 2021-11-15 DIAGNOSIS — M54.50 ACUTE BILATERAL LOW BACK PAIN WITHOUT SCIATICA: Primary | ICD-10-CM

## 2021-11-15 LAB
BILIRUB UR QL STRIP: NEGATIVE
GLUCOSE UR-MCNC: NEGATIVE MG/DL
KETONES P FAST UR STRIP-MCNC: NEGATIVE MG/DL
PH UR STRIP: 6 [PH] (ref 4.6–8)
PROT UR QL STRIP: NEGATIVE
SP GR UR STRIP: 1.02 (ref 1–1.03)
UA UROBILINOGEN AMB POC: NORMAL (ref 0.2–1)
URINALYSIS CLARITY POC: CLEAR
URINALYSIS COLOR POC: YELLOW
URINE BLOOD POC: NEGATIVE
URINE LEUKOCYTES POC: NORMAL
URINE NITRITES POC: NEGATIVE

## 2021-11-15 PROCEDURE — 81001 URINALYSIS AUTO W/SCOPE: CPT | Performed by: FAMILY MEDICINE

## 2021-11-15 PROCEDURE — 90471 IMMUNIZATION ADMIN: CPT | Performed by: FAMILY MEDICINE

## 2021-11-15 PROCEDURE — 90686 IIV4 VACC NO PRSV 0.5 ML IM: CPT | Performed by: FAMILY MEDICINE

## 2021-11-15 PROCEDURE — 99214 OFFICE O/P EST MOD 30 MIN: CPT | Performed by: FAMILY MEDICINE

## 2021-11-15 RX ORDER — DIAZEPAM 2 MG/1
2 TABLET ORAL
Qty: 12 TABLET | Refills: 0 | Status: SHIPPED | OUTPATIENT
Start: 2021-11-15

## 2021-11-15 NOTE — PROGRESS NOTES
Marisol Engle is a 31 y.o. female (: 1995) presenting to address:    No chief complaint on file. There were no vitals filed for this visit. Hearing/Vision:   No exam data present    Learning Assessment:     Learning Assessment 2021   PRIMARY LEARNER Patient   PRIMARY LANGUAGE ENGLISH   LEARNER PREFERENCE PRIMARY DEMONSTRATION   ANSWERED BY patient   RELATIONSHIP SELF     Depression Screening:     3 most recent PHQ Screens 11/15/2021   Little interest or pleasure in doing things Not at all   Feeling down, depressed, irritable, or hopeless Not at all   Total Score PHQ 2 0     Fall Risk Assessment:     Fall Risk Assessment, last 12 mths 11/15/2021   Able to walk? Yes   Fall in past 12 months? 0   Do you feel unsteady? 1   Are you worried about falling 0   Is TUG test greater than 12 seconds? 1   Is the gait abnormal? 0   Number of falls in past 12 months 0     Abuse Screening:     Abuse Screening Questionnaire 11/15/2021   Do you ever feel afraid of your partner? N   Are you in a relationship with someone who physically or mentally threatens you? N   Is it safe for you to go home? Y     ADL Assessment:   No flowsheet data found. Coordination of Care Questionaire:   1. Have you been to the ER, urgent care clinic since your last visit? Hospitalized since your last visit? NO    2. Have you seen or consulted any other health care providers outside of the [address] [place] [address] since your last visit? Include any pap smears or colon screening. NO    Advanced Directive:   1. Do you have an Advanced Directive? no    2. Would you like information on Advanced Directives?  YES

## 2021-11-15 NOTE — PROGRESS NOTES
Es Ambrosio is a 31 y.o.  female and presents with    Chief Complaint   Patient presents with    Back Pain    Abdominal Pain    Diabetes     Subjective:  She has back pain which has been present for 2 weeks; she had cold like symptoms and took theraflu then she took nyquil and she started having back pain. She denies blood in her urine. She has increased frequency of urination. She has sharp pain which builds then goes down to a mild pain. She has history of back pain but mild; she reports that current pain is the worse she has had. No relief with advil or tylenol; she has used heat therapy. ROS   General ROS: negative for - chills, fatigue or fever  Psychological ROS: negative for - anxiety or depression  Ophthalmic ROS: negative for - double vision  Endocrine ROS: negative for - polydipsia/polyuria or temperature intolerance  Respiratory ROS: no cough, shortness of breath, or wheezing  Cardiovascular ROS: no chest pain or dyspnea on exertion  Gastrointestinal ROS: no abdominal pain, change in bowel habits, or black or bloody stools  Genito-Urinary ROS: no dysuria, trouble voiding, or hematuria  Musculoskeletal ROS: back pain  Neurological ROS: negative for - numbness/tingling or weakness  Dermatological ROS: negative for - rash or skin lesion changes  All other systems reviewed and are negative.       Objective:  Vitals:    11/15/21 1415   BP: 137/87   Pulse: 92   Resp: 16   Temp: 98.7 °F (37.1 °C)   TempSrc: Temporal   SpO2: 98%   Weight: 250 lb 9.6 oz (113.7 kg)   Height: 5' 6\" (1.676 m)   PainSc:   3   LMP: 10/25/2021       alert, well appearing, and in no distress, oriented to person, place, and time and morbidly obese  Mental status - normal mood, behavior, speech, dress, motor activity, and thought processes  Chest - clear to auscultation, no wheezes, rales or rhonchi, symmetric air entry  Heart - normal rate, regular rhythm, normal S1, S2, no murmurs, rubs, clicks or gallops  Abdomen - soft, nontender, nondistended, no masses or organomegaly  Back exam - pain with motion noted during exam, tenderness noted lumbar paraspinal; no CVA ttp    LABS   Urinalysis - + leukocyte esterase  TESTS      Assessment/Plan:    1. Acute bilateral low back pain without sciatica  Start bzd with caution  - AMB POC URINALYSIS DIP STICK AUTO W/ MICRO  - diazePAM (VALIUM) 2 mg tablet; Take 1 Tablet by mouth every six (6) hours as needed for Muscle Spasm(s). Max Daily Amount: 8 mg. Dispense: 12 Tablet; Refill: 0  - REFERRAL TO PHYSICAL THERAPY    2. Sweating increase  Recommend antiperspirant     3. Encounter for immunization    - INFLUENZA VIRUS VAC QUAD,SPLIT,PRESV FREE SYRINGE IM      Lab review: orders written for new lab studies as appropriate; see orders      I have discussed the diagnosis with the patient and the intended plan as seen in the above orders. The patient has received an after-visit summary and questions were answered concerning future plans. I have discussed medication side effects and warnings with the patient as well. I have reviewed the plan of care with the patient, accepted their input and they are in agreement with the treatment goals.

## 2021-11-22 ENCOUNTER — HOSPITAL ENCOUNTER (OUTPATIENT)
Dept: PHYSICAL THERAPY | Age: 26
Discharge: HOME | End: 2021-11-22
Payer: OTHER GOVERNMENT

## 2021-11-22 PROCEDURE — 97161 PT EVAL LOW COMPLEX 20 MIN: CPT

## 2021-11-22 PROCEDURE — 97530 THERAPEUTIC ACTIVITIES: CPT

## 2021-11-22 NOTE — PROGRESS NOTES
PHYSICAL THERAPY - DAILY TREATMENT NOTE    Patient Name: Silvia Geller        Date: 2021  : 1995   YES Patient  Verified  Visit #:   1   of   8  Insurance: Payor:  / Plan: Evan Pedro 73 / Product Type:  /      In time: 3:10 Out time: 3:45   Total Treatment Time: 35     Medicare/BCBS [place] Time Tracking (below)   Total Timed Codes (min):  NA 1:1 Treatment Time:  NA     TREATMENT AREA =  L/S    SUBJECTIVE    Pain Level (on 0 to 10 scale):  4  / 10   Medication Changes/New allergies or changes in medical history, any new surgeries or procedures? NO    If yes, update Summary List   Subjective Functional Status/Changes:  []  No changes reported     2 weeks ago, woke up and couldn't bend over  Stay at home  Does art, painting furniture and sculptures  L>R Low back pain  Initialy started going into right leg but stopped. Normally Lies of stomach side  More urinary urgency with increase in pain   No prior pregnancies  Walking worse than standing or sitting  No x-rays or MRIs             OBJECTIVE      10 min Therapeutic Activity: MDT, Prone TA draw, seated with L/S roll, sit<>stand mechanics   Rationale:   Decrease stress on spine    min Patient Education:  YES  Reviewed HEP   []  Progressed/Changed HEP based on:   Issued HEP     Other Objective/Functional Measures:  L/S Flex 50% with Pain   LS Ext 50% mild decrease with TRENA  Bilateral L/S SB WNL  Supine L/S LTR Right Painful on left, Left NE  Symmetrical Leg Length in Supine  Symmetrical innominates in supine and standing  MMT:  Hip Flex Right 4+/5 Left 4//5  Knee Flex Right 4+/5 Left 4+/5  Knee Ext Right 4+/5 Left 4/5  DF BLE 5/5  Hip Abd Right 4/5 Left NT Pain  Prone decreased pain to 3/10       Post Treatment Pain Level (on 0 to 10) scale:   2-3  / 10     ASSESSMENT    Assessment/Changes in Function:     Justification for Eval Code Complexity: Low  Patient History (low 0, mod 1-2, high 3-4):  Moderate   Examination (low 1-2, mod 3+, high 4+): Moderate   Clinical Presentation (low: stable/uncomplicated; mod: evolving; high: unstable/unpredictable): Low  Clinical Decision Making (low , mod 26-74, high 1-25): Moderate         []  See Progress Note/Recertification   Patient will continue to benefit from skilled PT services to analyze,, cue,, progress,, modify,, demonstrate,, instruct, and address, movement patterns,, therapeutic interventions,, postural abnormalities,, soft tissue restrictions,, ROM,, strength,, functional mobility,, body mechanics/ergonomics, and home and community integration, to attain remaining goals.    Progress toward goals / Updated goals:    Goals set per POC     PLAN    []  Upgrade activities as tolerated YES Continue plan of care   []  Discharge due to :    []  Other:      Therapist: Samanta Benjamin DPT    Date: 11/22/2021 Time: 3:11 PM     Future Appointments   Date Time Provider Xiomy Luong   11/22/2021  3:30 PM Rishi Singh PT [facility]

## 2021-11-22 NOTE — PROGRESS NOTES
[facility] PHYSICAL THERAPY  [address] [facility] [address], [place], [address] - Phone: [phone]  Fax: [phone] / 1967 [facility]  Patient Name: Danyell Youssef : 1995   Medical   Diagnosis: Other low back pain [M54.59] Treatment Diagnosis: L/S posterior derangement   Onset Date: 2 weeks ago     Referral Source: Rangel Beard MD Start of [facility]): 2021   Prior Hospitalization: See medical history Provider #: [id number]   Prior Level of Function: Independent, Painting/Sculpting   Comorbidities: Hx of right elbow injury   Medications: Verified on Patient Summary List   The Plan of Care and following information is based on the information from the initial evaluation.   ==========================================================================================  Assessment / key information:  Pt is a 31year old female who presents to PT today with c/o recent onset L>R LBP ~ 2weeks ago. Pt reports she woke up with the inability to bend forward. She spends much of her day playing with her cat and is currently unable to  her cat with significant pain. S/S consistent with posterior lumbar spine derangement as pt's pain increases with forward bending and decreases with with gentle L/S extension &prone lying. Pt would benefit from skilled PT to address increased pain, poor posture and mechanics, decreased strength and ROM, as well as limited activity tolerance.    Obj:  L/S Flex 50% with Pain   LS Ext 50% mild decrease with TRENA  Bilateral L/S SB WNL  Supine L/S LTR Right Painful on left, Left NE  Symmetrical Leg Length in Supine  Symmetrical innominates in supine and standing  MMT:  Hip Flex Right 4+/5 Left 4//5  Knee Flex Right 4+/5 Left 4+/5  Knee Ext Right 4+/5 Left 4/5  DF BLE 5/5  Hip Abd Right 4/5 Left NT Pain  Prone decreased pain to 3/10  TTP Left paraspinals   FOTO score 49 indicating 51% functional disability   ==========================================================================================  Eval Complexity: History: MEDIUM  Complexity : 1-2 comorbidities / personal factors will impact the outcome/ POC Exam:MEDIUM Complexity : 3 Standardized tests and measures addressing body structure, function, activity limitation and / or participation in recreation  Presentation: LOW Complexity : Stable, uncomplicated  Clinical Decision Making:MEDIUM Complexity : FOTO score of 26-74Overall Complexity:LOW     Problem List: pain affecting function, decrease ROM, decrease strength, impaired gait/ balance, decrease ADL/ functional abilitiies, decrease activity tolerance, decrease flexibility/ joint mobility and decrease transfer abilities   Treatment Plan may include any combination of the following: Therapeutic exercise, Therapeutic activities, Neuromuscular re-education, Physical agent/modality, Gait/balance training, Manual therapy, Patient education, Self Care training, Functional mobility training, Home safety training and Stair training  Patient / Family readiness to learn indicated by: asking questions, trying to perform skills and interest  Persons(s) to be included in education: patient (P)  Barriers to Learning/Limitations: None  Measures taken: NA   Patient Goal (s): \"Pain reduction. Be able to  my cat without pain\"   Patient self reported health status: fair  Rehabilitation Potential: good   Short Term Goals: To be accomplished in  2  weeks:  1. Pt will be compliant with HEP for symptom management at home. 2. Pt will demo sit<>stand without good mechanics and no increased pain for ease with transfers. 3. Pt will report ability to centralize and abolish with directional preference to indicate reduction in derangement.  Long Term Goals: To be accomplished in  4  weeks:  1. Pt will be independent with HEP at D/C for self management.   2. Pt will perform x10 supine bridges without pain to indicate increased core stability. 3. Pt will demo box lift with good mechanics and no pain for safety at home lifting cat. 4. Pt will increase FS FOTO Score to >/= 72 to indicate a significant decrease in functional disability. Frequency / Duration:   Patient to be seen  2  times per week for 4  weeks:  Patient / Caregiver education and instruction: self care, activity modification and exercises    Therapist Signature: Bhavya Krause DPT Date: 13/97/3744   Certification Period: NA Time: 3:57 PM   ===========================================================================================  I certify that the above Physical Therapy Services are being furnished while the patient is under my care. I agree with the treatment plan and certify that this therapy is necessary. Physician Signature:        Date:       Time:     Please sign and return to [employer] or you may fax the signed copy to [phone]. Thank you.

## 2021-11-30 ENCOUNTER — HOSPITAL ENCOUNTER (OUTPATIENT)
Dept: PHYSICAL THERAPY | Age: 26
End: 2021-11-30
Payer: OTHER GOVERNMENT

## 2021-12-02 ENCOUNTER — HOSPITAL ENCOUNTER (OUTPATIENT)
Dept: PHYSICAL THERAPY | Age: 26
Discharge: HOME | End: 2021-12-02
Payer: OTHER GOVERNMENT

## 2021-12-02 PROCEDURE — 97530 THERAPEUTIC ACTIVITIES: CPT

## 2021-12-02 PROCEDURE — 97110 THERAPEUTIC EXERCISES: CPT

## 2021-12-02 NOTE — PROGRESS NOTES
PHYSICAL THERAPY - DAILY TREATMENT NOTE    Patient Name: Mary Dinh        Date: 2021  : 1995   yes Patient  Verified  Visit #:   2   of   8  Insurance: Payor: VANESSA / Plan: Evan Pedro 74 / Product Type:  /      In time: 202 Out time: 258   Total Treatment Time: 56     TREATMENT AREA =  Other low back pain [M54.59]    SUBJECTIVE  Pain Level (on 0 to 10 scale):  6  / 10   Medication Changes/New allergies or changes in medical history, any new surgeries or procedures?    no  If yes, update Summary List   Subjective Functional Status/Changes:  []  No changes reported     \"I did the HEP maybe twice a day. It didn't hurt but it didn't help really.  I been feeling down my left leg, 2 days ago when I got out of bed\"         OBJECTIVE  Modalities Rationale:     decrease pain to improve patient's ability to safely perform ADLs, bending/stooping/ lifting; perform prolong sitting/standing/ambulation; and negotiate stairs with no pain or limitations   10 min []  Ice     [x]  Heat    location/position: Supine with wedge under B LEs   [x]Skin assessment post-treatment (if applicable):   [x]  intact    []  redness- no adverse reaction                  []redness  adverse reaction:      36 min Therapeutic Exercise:  [x]  See flow sheet   Rationale:      increase ROM, increase strength and improve coordination to improve the patients ability to safely perform ADLs, bending/stooping/ lifting; perform prolong sitting/standing/ambulation; and negotiate stairs with no pain or limitations      10 min Therapeutic Activity: [x]  See flow sheet   Rationale:    increase ROM and increase strength to improve the patients ability to safely perform ADLs, bending/stooping/ lifting; perform prolong sitting/standing/ambulation; and negotiate stairs with no pain or limitations       Billed With/As:   [x] TE   [x] TA   [] Neuro   [] Self Care Patient Education: [x] Review HEP    [] Progressed/Changed HEP based on:   [x] positioning   [x] body mechanics   [x] transfers   [] heat/ice application    [x] other: Pt ed on importance and benefits of compliance with HEP, core strength/stability and proper posture; pt verbalized understanding  See updated HEP in chart       Other Objective/Functional Measures:  AMANDA with bed elevated> REIL with bed elevated-B/B reduced sxs     VCs + demo to perform proper technique for TE  Initiated TE per flowsheet  Reviewed proper bed mobility, sleeping positions,and importance and benefits of a neutral spine     Post Treatment Pain Level (on 0 to 10) scale:   3  / 10     ASSESSMENT  Assessment/Changes in Function:   Progressed TE without c/o increase p!  repeated/prolong L/s movement able to reduce sxs  (I) with bed mobility with no pain   []  See Progress Note/Recertification   Patient will continue to benefit from skilled PT services to modify and progress therapeutic interventions, address functional mobility deficits, address ROM deficits, address strength deficits, analyze and address soft tissue restrictions, analyze and cue movement patterns, analyze and modify body mechanics/ergonomics, assess and modify postural abnormalities and instruct in home and community integration to attain remaining goals.    Progress toward goals / Updated goals:  Pt's first visit since [facility], no noted progress        PLAN  [x]  Upgrade activities as tolerated yes Continue plan of care   []  Discharge due to :    []  Other:      Therapist: Eder Manuel PTA    Date: 12/2/2021 Time: 2:26 PM     Future Appointments   Date Time Provider Xiomy Luong   12/9/2021  2:00 PM Elizabeth Rogers PT [facility]   12/16/2021  2:00 PM Elizabeth Rogers PT [facility]   12/23/2021  2:00 PM Abiola Mora PTA [facility]   12/30/2021  2:00 PM Elizabeth Rogers PT [facility]

## 2021-12-04 DIAGNOSIS — R11.0 NAUSEA: ICD-10-CM

## 2021-12-04 RX ORDER — PROMETHAZINE HYDROCHLORIDE 25 MG/1
TABLET ORAL
Qty: 30 TABLET | Refills: 1 | Status: SHIPPED | OUTPATIENT
Start: 2021-12-04

## 2021-12-07 ENCOUNTER — APPOINTMENT (OUTPATIENT)
Dept: PHYSICAL THERAPY | Age: 26
End: 2021-12-07
Payer: OTHER GOVERNMENT

## 2021-12-09 ENCOUNTER — APPOINTMENT (OUTPATIENT)
Dept: PHYSICAL THERAPY | Age: 26
End: 2021-12-09
Payer: OTHER GOVERNMENT

## 2021-12-14 ENCOUNTER — APPOINTMENT (OUTPATIENT)
Dept: PHYSICAL THERAPY | Age: 26
End: 2021-12-14
Payer: OTHER GOVERNMENT

## 2021-12-16 ENCOUNTER — HOSPITAL ENCOUNTER (OUTPATIENT)
Dept: PHYSICAL THERAPY | Age: 26
Discharge: HOME | End: 2021-12-16
Payer: OTHER GOVERNMENT

## 2021-12-16 PROCEDURE — 97110 THERAPEUTIC EXERCISES: CPT

## 2021-12-21 ENCOUNTER — APPOINTMENT (OUTPATIENT)
Dept: PHYSICAL THERAPY | Age: 26
End: 2021-12-21
Payer: OTHER GOVERNMENT

## 2021-12-23 ENCOUNTER — APPOINTMENT (OUTPATIENT)
Dept: PHYSICAL THERAPY | Age: 26
End: 2021-12-23
Payer: OTHER GOVERNMENT

## 2021-12-28 ENCOUNTER — APPOINTMENT (OUTPATIENT)
Dept: PHYSICAL THERAPY | Age: 26
End: 2021-12-28
Payer: OTHER GOVERNMENT

## 2021-12-29 NOTE — PROGRESS NOTES
[address] PHYSICAL THERAPY  [address] [facility] [address], [address] - Phone: [phone]  Fax: [phone] SUMMARY  Patient Name: Makayla Kaye : 1995   Treatment/Medical Diagnosis: Other low back pain [M54.59]   Referral Source: Kaci Magallanes MD Provider # [id number]   Date of Initial Visit: 21 Attended Visits: 3 Missed Visits: 2     SUMMARY OF TREATMENT  Patient's POC has consisted of therex, therapeutic activities, manual therapy prn, modalities prn, pt. education, and a comprehensive HEP. Treatment strategies used to address functional mobility deficits, ROM deficits, strength deficits, analyze and address soft tissue restrictions, analyze and cue movement patterns, analyze and modify body mechanics/ergonomics, assess and modify postural abnormalities and instruct in home and community integration. CURRENT STATUS  Pt attended eval on 21 and had 2 additional visits. Pt was last seen on21 and requested d/c at this time. No reason for d/c request.     RECOMMENDATIONS  D/c with HEP per pt's request    If you have any questions/comments please contact us directly at [phone]. Thank you for allowing us to assist in the care of your patient. LPTA Signature: Teresa Gann PTA Date: 21   Therapist Signature: Rochelle Unger DPT Time: 3:52 PM         NOTE TO PHYSICIAN:  PLEASE COMPLETE THE ORDERS BELOW AND FAX TO   [facility]: [phone]. If you are unable to process this request in 24 hours please contact our office: [phone].    ___ I have read the above report and request that my patient continue as recommended.   ___ I have read the above report and request that my patient continue therapy with the following changes/special instructions:_________________________________________________________   ___ I have read the above report and request that my patient be discharged from therapy. Physician Signature:        Date:       Time:              Shimon Ortiz MD

## 2021-12-30 ENCOUNTER — APPOINTMENT (OUTPATIENT)
Dept: PHYSICAL THERAPY | Age: 26
End: 2021-12-30
Payer: OTHER GOVERNMENT

## 2022-03-15 ENCOUNTER — HOSPITAL ENCOUNTER (OUTPATIENT)
Dept: LAB | Age: 27
Discharge: HOME | End: 2022-03-15
Payer: OTHER GOVERNMENT

## 2022-03-15 ENCOUNTER — OFFICE VISIT (OUTPATIENT)
Dept: FAMILY MEDICINE CLINIC | Age: 27
End: 2022-03-15
Payer: OTHER GOVERNMENT

## 2022-03-15 VITALS
TEMPERATURE: 98.3 F | HEIGHT: 66 IN | SYSTOLIC BLOOD PRESSURE: 135 MMHG | RESPIRATION RATE: 17 BRPM | DIASTOLIC BLOOD PRESSURE: 85 MMHG | OXYGEN SATURATION: 98 % | WEIGHT: 247.2 LBS | HEART RATE: 85 BPM | BODY MASS INDEX: 39.73 KG/M2

## 2022-03-15 DIAGNOSIS — M54.50 ACUTE BILATERAL LOW BACK PAIN WITHOUT SCIATICA: ICD-10-CM

## 2022-03-15 DIAGNOSIS — R04.0 EPISTAXIS: Primary | ICD-10-CM

## 2022-03-15 DIAGNOSIS — R04.0 EPISTAXIS: ICD-10-CM

## 2022-03-15 DIAGNOSIS — Z11.59 ENCOUNTER FOR HEPATITIS C SCREENING TEST FOR LOW RISK PATIENT: ICD-10-CM

## 2022-03-15 LAB
APTT PPP: 31.3 SEC (ref 23–36.4)
BASOPHILS # BLD: 0 K/UL (ref 0–0.1)
BASOPHILS NFR BLD: 0 % (ref 0–2)
DIFFERENTIAL METHOD BLD: NORMAL
EOSINOPHIL # BLD: 0.1 K/UL (ref 0–0.4)
EOSINOPHIL NFR BLD: 2 % (ref 0–5)
ERYTHROCYTE [DISTWIDTH] IN BLOOD BY AUTOMATED COUNT: 13.6 % (ref 11.6–14.5)
HCT VFR BLD AUTO: 38.6 % (ref 35–45)
HGB BLD-MCNC: 12.4 G/DL (ref 12–16)
IMM GRANULOCYTES # BLD AUTO: 0 K/UL (ref 0–0.04)
IMM GRANULOCYTES NFR BLD AUTO: 0 % (ref 0–0.5)
INR PPP: 1 (ref 0.8–1.2)
LYMPHOCYTES # BLD: 2 K/UL (ref 0.9–3.6)
LYMPHOCYTES NFR BLD: 28 % (ref 21–52)
MCH RBC QN AUTO: 28.5 PG (ref 24–34)
MCHC RBC AUTO-ENTMCNC: 32.1 G/DL (ref 31–37)
MCV RBC AUTO: 88.7 FL (ref 78–100)
MONOCYTES # BLD: 0.4 K/UL (ref 0.05–1.2)
MONOCYTES NFR BLD: 6 % (ref 3–10)
NEUTS SEG # BLD: 4.5 K/UL (ref 1.8–8)
NEUTS SEG NFR BLD: 64 % (ref 40–73)
NRBC # BLD: 0 K/UL (ref 0–0.01)
NRBC BLD-RTO: 0 PER 100 WBC
PLATELET # BLD AUTO: 264 K/UL (ref 135–420)
PMV BLD AUTO: 11.7 FL (ref 9.2–11.8)
PROTHROMBIN TIME: 12.9 SEC (ref 11.5–15.2)
RBC # BLD AUTO: 4.35 M/UL (ref 4.2–5.3)
WBC # BLD AUTO: 7.1 K/UL (ref 4.6–13.2)

## 2022-03-15 PROCEDURE — 36415 COLL VENOUS BLD VENIPUNCTURE: CPT

## 2022-03-15 PROCEDURE — 99214 OFFICE O/P EST MOD 30 MIN: CPT | Performed by: FAMILY MEDICINE

## 2022-03-15 PROCEDURE — 85025 COMPLETE CBC W/AUTO DIFF WBC: CPT

## 2022-03-15 PROCEDURE — 85730 THROMBOPLASTIN TIME PARTIAL: CPT

## 2022-03-15 PROCEDURE — 85610 PROTHROMBIN TIME: CPT

## 2022-03-15 PROCEDURE — 86803 HEPATITIS C AB TEST: CPT

## 2022-03-15 NOTE — PROGRESS NOTES
Nilda Gabriel is a 31 y.o. female (: 1995) presenting to address:    Chief Complaint   Patient presents with    Epistaxis       There were no vitals filed for this visit. Hearing/Vision:   No exam data present    Learning Assessment:     Learning Assessment 2021   PRIMARY LEARNER Patient   PRIMARY LANGUAGE ENGLISH   LEARNER PREFERENCE PRIMARY DEMONSTRATION   ANSWERED BY patient   RELATIONSHIP SELF     Depression Screening:     3 most recent PHQ Screens 3/15/2022   Little interest or pleasure in doing things Not at all   Feeling down, depressed, irritable, or hopeless Not at all   Total Score PHQ 2 0     Fall Risk Assessment:     Fall Risk Assessment, last 12 mths 11/15/2021   Able to walk? Yes   Fall in past 12 months? 0   Do you feel unsteady? 1   Are you worried about falling 0   Is TUG test greater than 12 seconds? 1   Is the gait abnormal? 0   Number of falls in past 12 months 0     Abuse Screening:     Abuse Screening Questionnaire 11/15/2021   Do you ever feel afraid of your partner? N   Are you in a relationship with someone who physically or mentally threatens you? N   Is it safe for you to go home? Y     ADL Assessment:   No flowsheet data found. Coordination of Care Questionaire:   1. Have you been to the ER, urgent care clinic since your last visit? Hospitalized since your last visit? YES  Emergency room  2. Have you seen or consulted any other health care providers outside of the [address] [place] [address] since your last visit? Include any pap smears or colon screening.  NO

## 2022-03-15 NOTE — PROGRESS NOTES
Rakel Diehl is a 31 y.o.  female and presents with    Chief Complaint   Patient presents with    Epistaxis       Subjective:  Pt presents today for evaluation of nosebleeds that she has had since childhood. She notes that the nosebleeds last upwards of 15 minutes frequently and that they are brought on by changes in temperature and humidity but sometimes they are unprovoked. Ms. Wood Pimentel endorses passage of clots during her nosebleeds. ROS   MSK - back pain; no bowel or bladder incontinence; no pain in legs  All other systems reviewed and are negative. Objective:  Vitals:    03/15/22 1143   BP: 135/85   Pulse: 85   Resp: 17   Temp: 98.3 °F (36.8 °C)   TempSrc: Temporal   SpO2: 98%   Weight: 247 lb 3.2 oz (112.1 kg)   Height: 5' 6\" (1.676 m)   LMP: 02/15/2022       alert, well appearing, and in no distress and oriented to person, place, and time  Mental status - normal mood, behavior, speech, dress, motor activity, and thought processes  Nose - evidence of old blood  Chest - clear to auscultation, no wheezes, rales or rhonchi, symmetric air entry  Heart - normal rate, regular rhythm, normal S1, S2, no murmurs, rubs, clicks or gallops    LABS     TESTS      Assessment/Plan:    1. Epistaxis  Measure counts to assess for risk of bleeding; refer for further evaluation and treatment  - CBC WITH AUTOMATED DIFF; Future  - PROTHROMBIN TIME + INR; Future  - PTT; Future  - REFERRAL TO ENT-OTOLARYNGOLOGY    2. Acute bilateral low back pain without sciatica  Stretching and strengthening exercises reviewed    3. Encounter for hepatitis C screening test for low risk patient    - HEPATITIS C AB; Future      Lab review: orders written for new lab studies as appropriate; see orders      I have discussed the diagnosis with the patient and the intended plan as seen in the above orders. The patient has received an after-visit summary and questions were answered concerning future plans.   I have discussed medication side effects and warnings with the patient as well. I have reviewed the plan of care with the patient, accepted their input and they are in agreement with the treatment goals.

## 2022-03-16 LAB
HCV AB SER IA-ACNC: <0.02 INDEX
HCV AB SERPL QL IA: NEGATIVE
HCV COMMENT,HCGAC: NORMAL

## 2022-03-18 PROBLEM — M79.2 NEUROGENIC PAIN: Status: ACTIVE | Noted: 2020-11-25

## 2022-03-18 PROBLEM — Z47.89 ORTHOPEDIC AFTERCARE: Status: ACTIVE | Noted: 2021-01-08

## 2022-03-19 PROBLEM — M25.521 CHRONIC PAIN OF RIGHT ELBOW: Status: ACTIVE | Noted: 2020-11-25

## 2022-03-19 PROBLEM — T84.84XA PAIN DUE TO INTERNAL ORTHOPEDIC PROSTHETIC DEVICE (HCC): Status: ACTIVE | Noted: 2020-12-09

## 2022-03-19 PROBLEM — G89.29 CHRONIC PAIN OF RIGHT ELBOW: Status: ACTIVE | Noted: 2020-11-25

## 2022-03-20 PROBLEM — M19.121 POST-TRAUMATIC OSTEOARTHRITIS OF RIGHT ELBOW: Status: ACTIVE | Noted: 2020-11-25

## 2023-03-08 RX ORDER — PROPRANOLOL HYDROCHLORIDE 20 MG/1
TABLET ORAL
Qty: 90 TABLET | Refills: 3 | Status: SHIPPED | OUTPATIENT
Start: 2023-03-08

## (undated) DEVICE — DRESSING,GAUZE,XEROFORM,CURAD,5"X9",ST: Brand: CURAD

## (undated) DEVICE — INFECTION CONTROL KIT SYS

## (undated) DEVICE — BANDAGE COMPR M W6INXL10YD WHT BGE VELC E MTRX HK AND LOOP

## (undated) DEVICE — TOOL MC30 LEGEND 9CM 3.0MM CARBIDE: Brand: MIDAS REX™

## (undated) DEVICE — DRAPE,U/ SHT,SPLIT,PLAS,STERIL: Brand: MEDLINE

## (undated) DEVICE — SYRINGE IRRIG 60ML SFT PLIABLE BLB EZ TO GRP 1 HND USE W/

## (undated) DEVICE — STERILE POLYISOPRENE POWDER-FREE SURGICAL GLOVES WITH EMOLLIENT COATING: Brand: PROTEXIS

## (undated) DEVICE — 4-PORT MANIFOLD: Brand: NEPTUNE 2

## (undated) DEVICE — STERILE POLYISOPRENE POWDER-FREE SURGICAL GLOVES: Brand: PROTEXIS

## (undated) DEVICE — SUTURE VCRL SZ 1 L36IN ABSRB UD L36MM CT-1 1/2 CIR J947H

## (undated) DEVICE — STRAP,POSITIONING,KNEE/BODY,FOAM,4X60": Brand: MEDLINE

## (undated) DEVICE — SLING ARM LIFETEC ORTH UNIV --

## (undated) DEVICE — REM POLYHESIVE ADULT PATIENT RETURN ELECTRODE: Brand: VALLEYLAB

## (undated) DEVICE — Z CONVERTED USE 2107985 COVER FLROSCP W36XL28IN 4 SIDE ADH

## (undated) DEVICE — PADDING CST 4IN STERILE --

## (undated) DEVICE — SOLUTION IV 1000ML 0.9% SOD CHL

## (undated) DEVICE — DEVICE TRNSF SPIK STL 2008S] MICROTEK MEDICAL INC]

## (undated) DEVICE — SOLUTION SURG PREP 26 CC PURPREP

## (undated) DEVICE — TUBING SUCT 12FR MAL ALUM SHFT FN CAP VENT UNIV CONN W/ OBT

## (undated) DEVICE — YANKAUER,BULB TIP,W/O VENT,RIGID,STERILE: Brand: MEDLINE

## (undated) DEVICE — SOLUTION IRRIG 1000ML H2O STRL BLT

## (undated) DEVICE — BOWL UTIL GRAD 32OZ STRL --

## (undated) DEVICE — NEEDLE HYPO 18GA L1.5IN PNK S STL HUB POLYPR SHLD REG BVL

## (undated) DEVICE — 3M™ IOBAN™ 2 ANTIMICROBIAL INCISE DRAPE 6650EZ: Brand: IOBAN™ 2

## (undated) DEVICE — EXTREMITY III-LF: Brand: MEDLINE INDUSTRIES, INC.

## (undated) DEVICE — TUBING, SUCTION, 1/4" X 10', STRAIGHT: Brand: MEDLINE

## (undated) DEVICE — CONTAINER,SPEC,PNEUM TUBE,3OZ,STRL PATH: Brand: MEDLINE

## (undated) DEVICE — BANDAGE COBAN 4 IN COMPR W4INXL5YD FOAM COHESIVE QUIK STK SELF ADH SFT

## (undated) DEVICE — PAD,ABDOMINAL,8"X10",ST,LF: Brand: MEDLINE

## (undated) DEVICE — STRIP,CLOSURE,WOUND,MEDI-STRIP,1/2X4: Brand: MEDLINE